# Patient Record
Sex: MALE | Race: WHITE | NOT HISPANIC OR LATINO | Employment: FULL TIME | URBAN - METROPOLITAN AREA
[De-identification: names, ages, dates, MRNs, and addresses within clinical notes are randomized per-mention and may not be internally consistent; named-entity substitution may affect disease eponyms.]

---

## 2017-01-06 ENCOUNTER — HISTORICAL (OUTPATIENT)
Dept: LAB | Facility: HOSPITAL | Age: 42
End: 2017-01-06

## 2017-09-13 LAB — RAPID GROUP A STREP (OHS): NEGATIVE

## 2018-04-27 ENCOUNTER — HISTORICAL (OUTPATIENT)
Dept: LAB | Facility: HOSPITAL | Age: 43
End: 2018-04-27

## 2018-04-27 LAB — PSA SERPL-MCNC: 0.59 NG/ML (ref 0–4)

## 2022-03-18 ENCOUNTER — HISTORICAL (OUTPATIENT)
Dept: ADMINISTRATIVE | Facility: HOSPITAL | Age: 47
End: 2022-03-18

## 2022-03-19 LAB — (HCYS)2 SERPL-MCNC: 10.58 (ref 5.08–15.39)

## 2022-04-11 ENCOUNTER — HISTORICAL (OUTPATIENT)
Dept: ADMINISTRATIVE | Facility: HOSPITAL | Age: 47
End: 2022-04-11

## 2022-04-28 VITALS
SYSTOLIC BLOOD PRESSURE: 150 MMHG | OXYGEN SATURATION: 99 % | DIASTOLIC BLOOD PRESSURE: 91 MMHG | BODY MASS INDEX: 32.61 KG/M2 | HEIGHT: 68 IN | WEIGHT: 215.19 LBS

## 2022-09-21 ENCOUNTER — HISTORICAL (OUTPATIENT)
Dept: ADMINISTRATIVE | Facility: HOSPITAL | Age: 47
End: 2022-09-21

## 2024-02-29 ENCOUNTER — HOSPITAL ENCOUNTER (EMERGENCY)
Facility: HOSPITAL | Age: 49
Discharge: HOME | End: 2024-02-29
Attending: FAMILY MEDICINE
Payer: COMMERCIAL

## 2024-02-29 VITALS
BODY MASS INDEX: 32.58 KG/M2 | OXYGEN SATURATION: 94 % | HEIGHT: 69 IN | SYSTOLIC BLOOD PRESSURE: 124 MMHG | TEMPERATURE: 98 F | WEIGHT: 220 LBS | HEART RATE: 95 BPM | DIASTOLIC BLOOD PRESSURE: 65 MMHG | RESPIRATION RATE: 18 BRPM

## 2024-02-29 DIAGNOSIS — N43.3 HYDROCELE, UNSPECIFIED HYDROCELE TYPE: Primary | ICD-10-CM

## 2024-02-29 LAB
APPEARANCE UR: CLEAR
BACTERIA #/AREA URNS AUTO: NORMAL /HPF
BILIRUB UR QL STRIP.AUTO: NEGATIVE
COLOR UR AUTO: YELLOW
GLUCOSE UR QL STRIP.AUTO: NEGATIVE
KETONES UR QL STRIP.AUTO: NEGATIVE
LEUKOCYTE ESTERASE UR QL STRIP.AUTO: NEGATIVE
NITRITE UR QL STRIP.AUTO: NEGATIVE
PH UR STRIP.AUTO: 6 [PH]
PROT UR QL STRIP.AUTO: NEGATIVE
RBC #/AREA URNS AUTO: NORMAL /HPF
RBC UR QL AUTO: NEGATIVE
SP GR UR STRIP.AUTO: 1.01 (ref 1–1.03)
SQUAMOUS #/AREA URNS AUTO: NORMAL /HPF
UROBILINOGEN UR STRIP-ACNC: 0.2
WBC #/AREA URNS AUTO: NORMAL /HPF

## 2024-02-29 PROCEDURE — 81003 URINALYSIS AUTO W/O SCOPE: CPT | Performed by: FAMILY MEDICINE

## 2024-02-29 PROCEDURE — 99284 EMERGENCY DEPT VISIT MOD MDM: CPT

## 2024-02-29 RX ORDER — CIPROFLOXACIN 500 MG/1
500 TABLET ORAL 2 TIMES DAILY
Qty: 20 TABLET | Refills: 0 | Status: SHIPPED | OUTPATIENT
Start: 2024-02-29 | End: 2024-03-10

## 2024-02-29 RX ORDER — DICLOFENAC SODIUM 50 MG/1
50 TABLET, DELAYED RELEASE ORAL 3 TIMES DAILY
Qty: 15 TABLET | Refills: 0 | Status: ON HOLD | OUTPATIENT
Start: 2024-02-29 | End: 2024-03-07

## 2024-03-01 NOTE — ED PROVIDER NOTES
Encounter Date: 2/29/2024       History     Chief Complaint   Patient presents with    Groin Swelling     Pt c/o scrotal swelling that started today. Denies pain, dysuria or discharge.     Groin swelling   48-year-old comes in with groin swelling to the right testicle patient states that he did fall has a very large bruise on his right inner thigh possibly related to this injury otherwise no chronic history patient is the history source        Review of patient's allergies indicates:  No Known Allergies  No past medical history on file.  No past surgical history on file.  No family history on file.     Review of Systems   Constitutional:  Negative for fever.   HENT:  Negative for sore throat.    Respiratory:  Negative for shortness of breath.    Cardiovascular:  Negative for chest pain.   Gastrointestinal:  Negative for nausea.   Genitourinary:  Positive for scrotal swelling and testicular pain. Negative for dysuria.   Musculoskeletal:  Negative for back pain.   Skin:  Negative for rash.   Neurological:  Negative for weakness.   Hematological:  Does not bruise/bleed easily.   All other systems reviewed and are negative.      Physical Exam     Initial Vitals [02/29/24 2010]   BP Pulse Resp Temp SpO2   124/65 95 18 98 °F (36.7 °C) (!) 94 %      MAP       --         Physical Exam    Nursing note and vitals reviewed.  Constitutional: He appears well-developed and well-nourished. He is not diaphoretic. No distress.   HENT:   Head: Normocephalic and atraumatic.   Right Ear: External ear normal.   Left Ear: External ear normal.   Nose: Nose normal.   Mouth/Throat: Oropharynx is clear and moist. No oropharyngeal exudate.   Eyes: Conjunctivae and EOM are normal. Pupils are equal, round, and reactive to light. Right eye exhibits no discharge. Left eye exhibits no discharge.   Neck: Neck supple. No thyromegaly present. No tracheal deviation present. No JVD present.   Normal range of motion.  Cardiovascular:  Normal rate,  regular rhythm, normal heart sounds and intact distal pulses.     Exam reveals no gallop and no friction rub.       No murmur heard.  Pulmonary/Chest: Breath sounds normal. No stridor. No respiratory distress. He has no wheezes. He has no rhonchi. He has no rales. He exhibits no tenderness.   Abdominal: Abdomen is soft. Bowel sounds are normal. He exhibits no distension. There is no abdominal tenderness. There is no rebound and no guarding.   Musculoskeletal:         General: No tenderness or edema. Normal range of motion.      Cervical back: Normal range of motion and neck supple.     Lymphadenopathy:     He has no cervical adenopathy.   Neurological: He is alert and oriented to person, place, and time. He has normal strength and normal reflexes. No cranial nerve deficit or sensory deficit. GCS score is 15. GCS eye subscore is 4. GCS verbal subscore is 5. GCS motor subscore is 6.   Skin: Skin is warm and dry. No rash and no abscess noted. No erythema.   Psychiatric: He has a normal mood and affect. His behavior is normal. Judgment and thought content normal.         ED Course   Procedures  Labs Reviewed   URINALYSIS, REFLEX TO URINE CULTURE - Normal   URINALYSIS, MICROSCOPIC          Imaging Results    None          Medications - No data to display  Medical Decision Making  Hydrocele varicocele testicular torsion injury                                      Clinical Impression:  Final diagnoses:  [N43.3] Hydrocele, unspecified hydrocele type (Primary)          ED Disposition Condition    Discharge Stable          ED Prescriptions       Medication Sig Dispense Start Date End Date Auth. Provider    ciprofloxacin HCl (CIPRO) 500 MG tablet Take 1 tablet (500 mg total) by mouth 2 (two) times daily. for 10 days 20 tablet 2/29/2024 3/10/2024 Maurice Ortiz MD    diclofenac (VOLTAREN) 50 MG EC tablet Take 1 tablet (50 mg total) by mouth 3 (three) times daily. for 5 days 15 tablet 2/29/2024 3/5/2024 Maurice Ortiz MD           Follow-up Information       Follow up With Specialties Details Why Contact Info    Jessica Loyd, NP Nurse Practitioner   [address] [place] [address]  [phone]               Maurice Ortiz MD  02/29/24 0227

## 2024-03-05 ENCOUNTER — HOSPITAL ENCOUNTER (INPATIENT)
Facility: HOSPITAL | Age: 49
LOS: 1 days | Discharge: HOME | DRG: 176 | End: 2024-03-07
Attending: EMERGENCY MEDICINE | Admitting: INTERNAL MEDICINE
Payer: COMMERCIAL

## 2024-03-05 DIAGNOSIS — I26.99 PULMONARY EMBOLI: ICD-10-CM

## 2024-03-05 DIAGNOSIS — T78.2XXA ANAPHYLAXIS, INITIAL ENCOUNTER: ICD-10-CM

## 2024-03-05 DIAGNOSIS — I26.99 PULMONARY EMBOLISM: Primary | ICD-10-CM

## 2024-03-05 DIAGNOSIS — J96.01 ACUTE RESPIRATORY FAILURE WITH HYPOXIA: ICD-10-CM

## 2024-03-05 DIAGNOSIS — R06.00 DYSPNEA: ICD-10-CM

## 2024-03-05 DIAGNOSIS — R09.02 HYPOXIA: ICD-10-CM

## 2024-03-05 LAB
ALBUMIN SERPL-MCNC: 3.6 G/DL (ref 3.5–5)
ALBUMIN/GLOB SERPL: 1 RATIO (ref 1.1–2)
ALP SERPL-CCNC: 79 UNIT/L (ref 40–150)
ALT SERPL-CCNC: 17 UNIT/L (ref 0–55)
AST SERPL-CCNC: 21 UNIT/L (ref 5–34)
BILIRUB SERPL-MCNC: 1.1 MG/DL
BUN SERPL-MCNC: 5.3 MG/DL (ref 8.9–20.6)
CALCIUM SERPL-MCNC: 9.2 MG/DL (ref 8.4–10.2)
CHLORIDE SERPL-SCNC: 104 MMOL/L (ref 98–107)
CO2 SERPL-SCNC: 25 MMOL/L (ref 22–29)
CREAT SERPL-MCNC: 0.82 MG/DL (ref 0.73–1.18)
ERYTHROCYTE [DISTWIDTH] IN BLOOD BY AUTOMATED COUNT: 17.6 % (ref 11.5–17)
GFR SERPLBLD CREATININE-BSD FMLA CKD-EPI: >60 MLS/MIN/1.73/M2
GLOBULIN SER-MCNC: 3.6 GM/DL (ref 2.4–3.5)
GLUCOSE SERPL-MCNC: 122 MG/DL (ref 74–100)
HCT VFR BLD AUTO: 50.8 % (ref 42–52)
HGB BLD-MCNC: 16 G/DL (ref 14–18)
MCH RBC QN AUTO: 26.8 PG (ref 27–31)
MCHC RBC AUTO-ENTMCNC: 31.5 G/DL (ref 33–36)
MCV RBC AUTO: 85.2 FL (ref 80–94)
NRBC BLD AUTO-RTO: 0 %
PLATELET # BLD AUTO: 189 X10(3)/MCL (ref 130–400)
PMV BLD AUTO: 10.2 FL (ref 7.4–10.4)
POTASSIUM SERPL-SCNC: 3.9 MMOL/L (ref 3.5–5.1)
PROT SERPL-MCNC: 7.2 GM/DL (ref 6.4–8.3)
RBC # BLD AUTO: 5.96 X10(6)/MCL (ref 4.7–6.1)
SODIUM SERPL-SCNC: 136 MMOL/L (ref 136–145)
WBC # SPEC AUTO: 12.03 X10(3)/MCL (ref 4.5–11.5)

## 2024-03-05 PROCEDURE — 99285 EMERGENCY DEPT VISIT HI MDM: CPT | Mod: 25

## 2024-03-05 PROCEDURE — 84484 ASSAY OF TROPONIN QUANT: CPT | Performed by: EMERGENCY MEDICINE

## 2024-03-05 PROCEDURE — 80053 COMPREHEN METABOLIC PANEL: CPT | Performed by: EMERGENCY MEDICINE

## 2024-03-05 PROCEDURE — 86850 RBC ANTIBODY SCREEN: CPT | Performed by: EMERGENCY MEDICINE

## 2024-03-05 PROCEDURE — 63600175 PHARM REV CODE 636 W HCPCS

## 2024-03-05 PROCEDURE — 96375 TX/PRO/DX INJ NEW DRUG ADDON: CPT

## 2024-03-05 PROCEDURE — 25000003 PHARM REV CODE 250: Performed by: EMERGENCY MEDICINE

## 2024-03-05 PROCEDURE — 85027 COMPLETE CBC AUTOMATED: CPT | Performed by: EMERGENCY MEDICINE

## 2024-03-05 RX ORDER — METHYLPREDNISOLONE SOD SUCC 125 MG
VIAL (EA) INJECTION
Status: COMPLETED
Start: 2024-03-05 | End: 2024-03-05

## 2024-03-05 RX ORDER — SULFAMETHOXAZOLE AND TRIMETHOPRIM 200; 40 MG/5ML; MG/5ML
8 SUSPENSION ORAL
Status: ON HOLD | COMMUNITY
End: 2024-03-06

## 2024-03-05 RX ORDER — METHYLPREDNISOLONE SOD SUCC 125 MG
125 VIAL (EA) INJECTION
Status: COMPLETED | OUTPATIENT
Start: 2024-03-05 | End: 2024-03-05

## 2024-03-05 RX ORDER — EPINEPHRINE 0.3 MG/.3ML
0.3 INJECTION SUBCUTANEOUS
Status: COMPLETED | OUTPATIENT
Start: 2024-03-06 | End: 2024-03-06

## 2024-03-05 RX ORDER — DIPHENHYDRAMINE HYDROCHLORIDE 50 MG/ML
INJECTION INTRAMUSCULAR; INTRAVENOUS
Status: COMPLETED
Start: 2024-03-05 | End: 2024-03-05

## 2024-03-05 RX ORDER — DIPHENHYDRAMINE HYDROCHLORIDE 50 MG/ML
50 INJECTION INTRAMUSCULAR; INTRAVENOUS
Status: ACTIVE | OUTPATIENT
Start: 2024-03-05 | End: 2024-03-06

## 2024-03-05 RX ORDER — FAMOTIDINE 10 MG/ML
40 INJECTION INTRAVENOUS
Status: COMPLETED | OUTPATIENT
Start: 2024-03-05 | End: 2024-03-05

## 2024-03-05 RX ADMIN — METHYLPREDNISOLONE SODIUM SUCCINATE 125 MG: 125 INJECTION, POWDER, FOR SOLUTION INTRAMUSCULAR; INTRAVENOUS at 10:03

## 2024-03-05 RX ADMIN — FAMOTIDINE 40 MG: 10 INJECTION, SOLUTION INTRAVENOUS at 10:03

## 2024-03-05 RX ADMIN — DIPHENHYDRAMINE HYDROCHLORIDE: 50 INJECTION INTRAMUSCULAR; INTRAVENOUS at 10:03

## 2024-03-05 RX ADMIN — Medication 125 MG: at 10:03

## 2024-03-05 NOTE — Clinical Note
Diagnosis: Pulmonary embolism [964442]  Future Attending Provider: FLORIAN PRESTON [[id number]]  Admit to which facility:: [facility] [[id number]]  Reason for IP Medical Treatment  (Clinical interventions that can only be accom plished in the IP setting? ) :: pulmonary embolism  I certify that Inpatient services for greater than or equal to 2 midnights are medically necessary:: Yes  Plans for Post-Acute care--if anticipated (pick the single best option):: B. Discharge home  with medical equipment

## 2024-03-06 LAB
ABORH RETYPE: NORMAL
ABS NEUT (OLG): 7.46 X10(3)/MCL (ref 2.1–9.2)
ALLENS TEST BLOOD GAS (OHS): YES
AORTIC ROOT ANNULUS: 3.7 CM
AORTIC VALVE CUSP SEPERATION: 2.2 CM
APTT PPP: 30.9 SECONDS (ref 23.2–33.7)
APTT PPP: 44 SECONDS (ref 23.2–33.7)
APTT PPP: 56.8 SECONDS (ref 23.2–33.7)
AV INDEX (PROSTH): 0.75
AV MEAN GRADIENT: 7 MMHG
AV PEAK GRADIENT: 13 MMHG
AV VALVE AREA BY VELOCITY RATIO: 2.12 CM²
AV VALVE AREA: 2.36 CM²
AV VELOCITY RATIO: 0.68
BASE EXCESS BLD CALC-SCNC: 2.3 MMOL/L (ref -2–2)
BASOPHILS # BLD AUTO: 0.03 X10(3)/MCL
BASOPHILS NFR BLD AUTO: 0.4 %
BASOPHILS NFR BLD MANUAL: 0.24 X10(3)/MCL (ref 0–0.2)
BASOPHILS NFR BLD MANUAL: 2 %
BLOOD GAS SAMPLE TYPE (OHS): ABNORMAL
BNP BLD-MCNC: <10 PG/ML
BSA FOR ECHO PROCEDURE: 2.2 M2
CA-I BLD-SCNC: 1.23 MMOL/L (ref 1.12–1.23)
CO2 BLDA-SCNC: 27.7 MMOL/L
COHGB MFR BLDA: 1.5 % (ref 0.5–1.5)
CV ECHO LV RWT: 0.43 CM
DOP CALC AO PEAK VEL: 1.79 M/S
DOP CALC AO VTI: 29.3 CM
DOP CALC LVOT AREA: 3.1 CM2
DOP CALC LVOT DIAMETER: 2 CM
DOP CALC LVOT PEAK VEL: 1.21 M/S
DOP CALC LVOT STROKE VOLUME: 69.08 CM3
DOP CALC MV VTI: 27.3 CM
DOP CALCLVOT PEAK VEL VTI: 22 CM
DRAWN BY BLOOD GAS (OHS): ABNORMAL
E WAVE DECELERATION TIME: 177 MSEC
E/A RATIO: 0.46
E/E' RATIO: 4.08 M/S
ECHO LV POSTERIOR WALL: 1.05 CM (ref 0.6–1.1)
EOSINOPHIL # BLD AUTO: 0.03 X10(3)/MCL (ref 0–0.9)
EOSINOPHIL NFR BLD AUTO: 0.4 %
EOSINOPHIL NFR BLD MANUAL: 0.48 X10(3)/MCL (ref 0–0.9)
EOSINOPHIL NFR BLD MANUAL: 4 %
ERYTHROCYTE [DISTWIDTH] IN BLOOD BY AUTOMATED COUNT: 16.6 % (ref 11.5–17)
FLUAV AG UPPER RESP QL IA.RAPID: NOT DETECTED
FLUBV AG UPPER RESP QL IA.RAPID: NOT DETECTED
FRACTIONAL SHORTENING: 27 % (ref 28–44)
GROUP & RH: NORMAL
HCO3 BLDA-SCNC: 26.5 MMOL/L (ref 22–26)
HCT VFR BLD AUTO: 48 % (ref 42–52)
HGB BLD-MCNC: 15 G/DL (ref 14–18)
IMM GRANULOCYTES # BLD AUTO: 0.02 X10(3)/MCL (ref 0–0.04)
IMM GRANULOCYTES NFR BLD AUTO: 0.2 %
INDIRECT COOMBS: NORMAL
INR PPP: 1.2
INSTRUMENT WBC (OLG): 12.03 X10(3)/MCL
INTERVENTRICULAR SEPTUM: 1.01 CM (ref 0.6–1.1)
LACTATE SERPL-SCNC: 1 MMOL/L (ref 0.5–2.2)
LEFT INTERNAL DIMENSION IN SYSTOLE: 3.55 CM (ref 2.1–4)
LEFT VENTRICLE DIASTOLIC VOLUME INDEX: 52.09 ML/M2
LEFT VENTRICLE DIASTOLIC VOLUME: 112 ML
LEFT VENTRICLE MASS INDEX: 85 G/M2
LEFT VENTRICLE SYSTOLIC VOLUME INDEX: 24.5 ML/M2
LEFT VENTRICLE SYSTOLIC VOLUME: 52.6 ML
LEFT VENTRICULAR INTERNAL DIMENSION IN DIASTOLE: 4.88 CM (ref 3.5–6)
LEFT VENTRICULAR MASS: 182.01 G
LPM (OHS): 3
LV LATERAL E/E' RATIO: 3.06 M/S
LV SEPTAL E/E' RATIO: 6.13 M/S
LVOT MG: 3 MMHG
LVOT MV: 0.83 CM/S
LYMPHOCYTES # BLD AUTO: 0.5 X10(3)/MCL (ref 0.6–4.6)
LYMPHOCYTES NFR BLD AUTO: 5.9 %
LYMPHOCYTES NFR BLD MANUAL: 25 %
LYMPHOCYTES NFR BLD MANUAL: 3.01 X10(3)/MCL
MACROCYTES BLD QL SMEAR: ABNORMAL
MCH RBC QN AUTO: 27.3 PG (ref 27–31)
MCHC RBC AUTO-ENTMCNC: 31.3 G/DL (ref 33–36)
MCV RBC AUTO: 87.4 FL (ref 80–94)
METHGB MFR BLDA: 0.5 % (ref 0.4–1.5)
MONOCYTES # BLD AUTO: 0.1 X10(3)/MCL (ref 0.1–1.3)
MONOCYTES NFR BLD AUTO: 1.2 %
MONOCYTES NFR BLD MANUAL: 0.84 X10(3)/MCL (ref 0.1–1.3)
MONOCYTES NFR BLD MANUAL: 7 %
MV MEAN GRADIENT: 2 MMHG
MV PEAK A VEL: 1.07 M/S
MV PEAK E VEL: 0.49 M/S
MV PEAK GRADIENT: 7 MMHG
MV STENOSIS PRESSURE HALF TIME: 67 MS
MV VALVE AREA BY CONTINUITY EQUATION: 2.53 CM2
MV VALVE AREA P 1/2 METHOD: 3.28 CM2
NEUTROPHILS # BLD AUTO: 7.8 X10(3)/MCL (ref 2.1–9.2)
NEUTROPHILS NFR BLD AUTO: 91.9 %
NEUTROPHILS NFR BLD MANUAL: 62 %
NRBC BLD AUTO-RTO: 0 %
NRBC BLD MANUAL-RTO: 1 %
O2 HB BLOOD GAS (OHS): 93.3 % (ref 94–97)
OXYGEN DEVICE BLOOD GAS (OHS): ABNORMAL
OXYHGB MFR BLDA: 15.3 G/DL (ref 12–16)
PCO2 BLDA: 39 MMHG (ref 35–45)
PH BLDA: 7.44 [PH] (ref 7.35–7.45)
PISA MRMAX VEL: 1.66 M/S
PISA TR MAX VEL: 1.84 M/S
PLATELET # BLD AUTO: 184 X10(3)/MCL (ref 130–400)
PLATELET # BLD EST: NORMAL 10*3/UL
PMV BLD AUTO: 10 FL (ref 7.4–10.4)
PO2 BLDA: 66 MMHG (ref 80–100)
POLYCHROMASIA BLD QL SMEAR: ABNORMAL
POTASSIUM BLOOD GAS (OHS): 4.1 MMOL/L (ref 3.5–5)
PROTHROMBIN TIME: 14.7 SECONDS (ref 12.5–14.5)
PV PEAK GRADIENT: 9 MMHG
PV PEAK VELOCITY: 1.51 M/S
RA PRESSURE ESTIMATED: 8 MMHG
RBC # BLD AUTO: 5.49 X10(6)/MCL (ref 4.7–6.1)
RBC MORPH BLD: ABNORMAL
RV MID DIAMA: 2.86 CM
RV TB RVSP: 10 MMHG
RV TISSUE DOPPLER FREE WALL SYSTOLIC VELOCITY 1 (APICAL 4 CHAMBER VIEW): 16.1 CM/S
SAMPLE SITE BLOOD GAS (OHS): ABNORMAL
SAO2 % BLDA: 93.5 %
SARS-COV-2 RNA RESP QL NAA+PROBE: NOT DETECTED
SODIUM BLOOD GAS (OHS): 131 MMOL/L (ref 137–145)
SPECIMEN OUTDATE: NORMAL
TDI LATERAL: 0.16 M/S
TDI SEPTAL: 0.08 M/S
TDI: 0.12 M/S
TR MAX PG: 14 MMHG
TRICUSPID ANNULAR PLANE SYSTOLIC EXCURSION: 2.68 CM
TROPONIN I SERPL-MCNC: 0.23 NG/ML (ref 0–0.04)
TROPONIN I SERPL-MCNC: <0.01 NG/ML (ref 0–0.04)
TV REST PULMONARY ARTERY PRESSURE: 22 MMHG
WBC # SPEC AUTO: 8.48 X10(3)/MCL (ref 4.5–11.5)
Z-SCORE OF LEFT VENTRICULAR DIMENSION IN END DIASTOLE: -3.58
Z-SCORE OF LEFT VENTRICULAR DIMENSION IN END SYSTOLE: -1.43

## 2024-03-06 PROCEDURE — 82803 BLOOD GASES ANY COMBINATION: CPT

## 2024-03-06 PROCEDURE — 87040 BLOOD CULTURE FOR BACTERIA: CPT | Performed by: EMERGENCY MEDICINE

## 2024-03-06 PROCEDURE — 25000003 PHARM REV CODE 250: Performed by: EMERGENCY MEDICINE

## 2024-03-06 PROCEDURE — 25500020 PHARM REV CODE 255: Performed by: EMERGENCY MEDICINE

## 2024-03-06 PROCEDURE — 84484 ASSAY OF TROPONIN QUANT: CPT | Performed by: EMERGENCY MEDICINE

## 2024-03-06 PROCEDURE — 36600 WITHDRAWAL OF ARTERIAL BLOOD: CPT

## 2024-03-06 PROCEDURE — 85610 PROTHROMBIN TIME: CPT | Performed by: EMERGENCY MEDICINE

## 2024-03-06 PROCEDURE — 63600175 PHARM REV CODE 636 W HCPCS: Performed by: EMERGENCY MEDICINE

## 2024-03-06 PROCEDURE — 99900035 HC TECH TIME PER 15 MIN (STAT)

## 2024-03-06 PROCEDURE — 83605 ASSAY OF LACTIC ACID: CPT | Performed by: EMERGENCY MEDICINE

## 2024-03-06 PROCEDURE — 85730 THROMBOPLASTIN TIME PARTIAL: CPT | Performed by: INTERNAL MEDICINE

## 2024-03-06 PROCEDURE — 96372 THER/PROPH/DIAG INJ SC/IM: CPT | Mod: 59 | Performed by: EMERGENCY MEDICINE

## 2024-03-06 PROCEDURE — 27000221 HC OXYGEN, UP TO 24 HOURS

## 2024-03-06 PROCEDURE — 11000001 HC ACUTE MED/SURG PRIVATE ROOM

## 2024-03-06 PROCEDURE — 85025 COMPLETE CBC W/AUTO DIFF WBC: CPT | Performed by: EMERGENCY MEDICINE

## 2024-03-06 PROCEDURE — 85730 THROMBOPLASTIN TIME PARTIAL: CPT | Performed by: EMERGENCY MEDICINE

## 2024-03-06 PROCEDURE — 96375 TX/PRO/DX INJ NEW DRUG ADDON: CPT

## 2024-03-06 PROCEDURE — 93005 ELECTROCARDIOGRAM TRACING: CPT

## 2024-03-06 PROCEDURE — 0240U COVID/FLU A&B PCR: CPT | Performed by: EMERGENCY MEDICINE

## 2024-03-06 PROCEDURE — 96365 THER/PROPH/DIAG IV INF INIT: CPT

## 2024-03-06 PROCEDURE — 83880 ASSAY OF NATRIURETIC PEPTIDE: CPT | Performed by: EMERGENCY MEDICINE

## 2024-03-06 RX ORDER — PANTOPRAZOLE SODIUM 40 MG/1
40 TABLET, DELAYED RELEASE ORAL DAILY
COMMUNITY

## 2024-03-06 RX ORDER — AMLODIPINE AND BENAZEPRIL HYDROCHLORIDE 10; 40 MG/1; MG/1
1 CAPSULE ORAL DAILY
Status: ON HOLD | COMMUNITY
End: 2024-03-13

## 2024-03-06 RX ORDER — TESTOSTERONE CYPIONATE 200 MG/ML
100 INJECTION, SOLUTION INTRAMUSCULAR
Status: ON HOLD | COMMUNITY
End: 2024-03-06

## 2024-03-06 RX ORDER — PANTOPRAZOLE SODIUM 40 MG/1
40 TABLET, DELAYED RELEASE ORAL DAILY
Status: DISCONTINUED | OUTPATIENT
Start: 2024-03-07 | End: 2024-03-07 | Stop reason: HOSPADM

## 2024-03-06 RX ORDER — AMLODIPINE BESYLATE 5 MG/1
10 TABLET ORAL DAILY
Status: DISCONTINUED | OUTPATIENT
Start: 2024-03-07 | End: 2024-03-07 | Stop reason: HOSPADM

## 2024-03-06 RX ORDER — CITALOPRAM 10 MG/1
10 TABLET ORAL DAILY
COMMUNITY

## 2024-03-06 RX ORDER — HEPARIN SODIUM,PORCINE/D5W 25000/250
0-40 INTRAVENOUS SOLUTION INTRAVENOUS CONTINUOUS
Status: DISCONTINUED | OUTPATIENT
Start: 2024-03-06 | End: 2024-03-07

## 2024-03-06 RX ORDER — CITALOPRAM 10 MG/1
10 TABLET ORAL DAILY
Status: DISCONTINUED | OUTPATIENT
Start: 2024-03-07 | End: 2024-03-07 | Stop reason: HOSPADM

## 2024-03-06 RX ORDER — LISINOPRIL 10 MG/1
40 TABLET ORAL DAILY
Status: DISCONTINUED | OUTPATIENT
Start: 2024-03-07 | End: 2024-03-07 | Stop reason: HOSPADM

## 2024-03-06 RX ADMIN — EPINEPHRINE 0.3 MG: 0.3 INJECTION INTRAMUSCULAR at 12:03

## 2024-03-06 RX ADMIN — IOHEXOL 100 ML: 350 INJECTION, SOLUTION INTRAVENOUS at 12:03

## 2024-03-06 RX ADMIN — HEPARIN SODIUM 23 UNITS/KG/HR: 10000 INJECTION, SOLUTION INTRAVENOUS at 07:03

## 2024-03-06 RX ADMIN — HEPARIN SODIUM 27.95 UNITS/KG/HR: 10000 INJECTION, SOLUTION INTRAVENOUS at 10:03

## 2024-03-06 RX ADMIN — HEPARIN SODIUM 18 UNITS/KG/HR: 10000 INJECTION, SOLUTION INTRAVENOUS at 02:03

## 2024-03-06 RX ADMIN — CEFTRIAXONE SODIUM 1 G: 1 INJECTION, POWDER, FOR SOLUTION INTRAMUSCULAR; INTRAVENOUS at 03:03

## 2024-03-06 RX ADMIN — HEPARIN SODIUM 21 UNITS/KG/HR: 10000 INJECTION, SOLUTION INTRAVENOUS at 12:03

## 2024-03-06 NOTE — PLAN OF CARE
03/06/24 1338   Discharge Assessment   Assessment Type Discharge Planning Assessment   Confirmed/corrected address, phone number and insurance Yes   Confirmed Demographics Correct on Facesheet   Source of Information patient   Communicated LAYLA with patient/caregiver Date not available/Unable to determine   Reason For Admission PE   People in Home spouse   Do you expect to return to your current living situation? Yes   Do you have help at home or someone to help you manage your care at home? Yes   Who are your caregiver(s) and their phone number(s)? Maria Luisa Gold [phone]   Prior to hospitilization cognitive status: Unable to Assess   Current cognitive status: Alert/Oriented   Walking or Climbing Stairs Difficulty no   Dressing/Bathing Difficulty no   Home Accessibility wheelchair accessible   Home Layout Able to live on 1st floor   Equipment Currently Used at Home none   Patient currently being followed by outpatient case management? No   Do you currently have service(s) that help you manage your care at home? No   Do you take prescription medications? Yes   Do you have prescription coverage? Yes   Coverage BCBS   Who is going to help you get home at discharge? Family   How do you get to doctors appointments? car, drives self   Are you on dialysis? No   Do you take coumadin? No   Discharge Plan A Home with family   Discharge Plan B Home with family   DME Needed Upon Discharge  none   Discharge Plan discussed with: Patient   Transition of Care Barriers None   OTHER   Name(s) of People in Home Maria Luisa Asif

## 2024-03-06 NOTE — CONSULTS
Inpatient consult to Cardiology  Consult performed by: Kristel Mcgee FNP  Consult ordered by: Cody Moncada MD  Reason for consult: PE        [facility] - 6th Floor Medical Telemetry    Cardiology  Consult Note    Patient Name: Oracio Gold  MRN: [id number]  Admission Date: 3/5/2024  Hospital Length of Stay: 0 days  Code Status: No Order   Attending Provider: Cody Moncada*   Consulting Provider: TOMMIE Lawson  Primary Care Physician: Jessica Loyd NP  Principal Problem:<principal problem not specified>    Patient information was obtained from patient and ER records.     Subjective:     Reason for Consult: PE    HPI: Mr. Gold is a 48 year old male who is previously known to [facility], Dr. Kasper (2021). He presents to the ER with complaints of upper lip swelling. He reports that he began to have a skin rash, welts, & lip swelling that began last night after taking 3 doses of bactrim over the last few days. He denies CP, SOB, palpitations, nausea, or vomiting. Significant labs include WBC 12.03, Trop 0.234 -> <0.010. A CTA chest was obtained and demonstrated a large defect in the right main pulmonary artery, which extends into the right lower lobe, lobar and proximal segmental divisions without evidence of RV strain and possible PNA. He denies any provoking  factors, such as recent travel, recent surgery, history of CA, or family hx. Of note, the patient was diagnosed with a PE in 2021 and was on oral anticoagulation x 6 weeks. [facility] has been consulted to further evaluate the patient's PE.     PMH: PE, HTN, anxiety   PSH: excision of soft tissue  Family History: Father - CABG x 4, irregular heart rate  Social History: Every day tobacco use. Denies alcohol or illicit drug use.     Previous Cardiac Diagnostics:   CTA Chest 3.6.24:  There is a large defect in the right main pulmonary artery, which extends into the right lower lobe, lobar and proximal  segmental divisions. This is consistent with pulmonary thromboembolism. No right ventricular strain is seen.  The lungs are slightly heterogeneous, which may reflect small airways disease versus mosaic attenuation. Ill-defined ground-glass opacities are seen in the posterior aspect of the right upper lobe, which may reflect pneumonia. Correlate with clinical and laboratory findings.  Trace right pleural effusion is seen.    BLE NIVA (9.16.21):  Positive acute deep vein thrombosis was identified in the left common femoral, sapheno-femoral junction, superficial  femoral and popliteal veins.  Superficial thrombosis was identified in the left above and below knee greater saphenous vein.     Review of patient's allergies indicates:   Allergen Reactions    Bactrim [sulfamethoxazole-trimethoprim] Anaphylaxis     ANGIOEDEMA     No current facility-administered medications on file prior to encounter.     Current Outpatient Medications on File Prior to Encounter   Medication Sig    ciprofloxacin HCl (CIPRO) 500 MG tablet Take 1 tablet (500 mg total) by mouth 2 (two) times daily. for 10 days    [] diclofenac (VOLTAREN) 50 MG EC tablet Take 1 tablet (50 mg total) by mouth 3 (three) times daily. for 5 days    sulfamethoxazole-trimethoprim 200-40 mg/5 ml (BACTRIM,SEPTRA) 200-40 mg/5 mL Susp Take 8 mg/kg/day by mouth every 12 (twelve) hours.     Review of Systems   HENT:          Lip swelling   Respiratory:  Negative for shortness of breath.    Cardiovascular:  Negative for chest pain and palpitations.       Objective:     Vital Signs (Most Recent):  Temp: 98 °F (36.7 °C) (24 0724)  Pulse: 69 (24 0741)  Resp: 19 (24 0741)  BP: (!) 117/58 (24 0651)  SpO2: 96 % (24 0947) Vital Signs (24h Range):  Temp:  [98 °F (36.7 °C)-98.5 °F (36.9 °C)] 98 °F (36.7 °C)  Pulse:  [69-92] 69  Resp:  [17-26] 19  SpO2:  [89 %-99 %] 96 %  BP: (109-148)/() 117/58   Weight: 99.8 kg (220 lb)  Body mass index is  32.49 kg/m².  SpO2: 96 %       Intake/Output Summary (Last 24 hours) at 3/6/2024 1030  Last data filed at 3/6/2024 0735  Gross per 24 hour   Intake --   Output 800 ml   Net -800 ml     Lines/Drains/Airways       Peripheral Intravenous Line  Duration                  Peripheral IV - Single Lumen 03/05/24 2227 20 G Distal;Posterior;Right Forearm <1 day         Peripheral IV - Single Lumen 03/05/24 2230 20 G Posterior;Right Hand <1 day                  Significant Labs:  Recent Results (from the past 72 hour(s))   Comprehensive metabolic panel    Collection Time: 03/05/24 10:28 PM   Result Value Ref Range    Sodium Level 136 136 - 145 mmol/L    Potassium Level 3.9 3.5 - 5.1 mmol/L    Chloride 104 98 - 107 mmol/L    Carbon Dioxide 25 22 - 29 mmol/L    Glucose Level 122 (H) 74 - 100 mg/dL    Blood Urea Nitrogen 5.3 (L) 8.9 - 20.6 mg/dL    Creatinine 0.82 0.73 - 1.18 mg/dL    Calcium Level Total 9.2 8.4 - 10.2 mg/dL    Protein Total 7.2 6.4 - 8.3 gm/dL    Albumin Level 3.6 3.5 - 5.0 g/dL    Globulin 3.6 (H) 2.4 - 3.5 gm/dL    Albumin/Globulin Ratio 1.0 (L) 1.1 - 2.0 ratio    Bilirubin Total 1.1 <=1.5 mg/dL    Alkaline Phosphatase 79 40 - 150 unit/L    Alanine Aminotransferase 17 0 - 55 unit/L    Aspartate Aminotransferase 21 5 - 34 unit/L    eGFR >60 mls/min/1.73/m2   Type & Screen    Collection Time: 03/05/24 10:28 PM   Result Value Ref Range    Group & Rh A POS     Indirect Rylee GEL NEG     Specimen Outdate 03/08/2024 23:59    CBC with Differential    Collection Time: 03/05/24 10:28 PM   Result Value Ref Range    WBC 12.03 (H) 4.50 - 11.50 x10(3)/mcL    RBC 5.96 4.70 - 6.10 x10(6)/mcL    Hgb 16.0 14.0 - 18.0 g/dL    Hct 50.8 42.0 - 52.0 %    MCV 85.2 80.0 - 94.0 fL    MCH 26.8 (L) 27.0 - 31.0 pg    MCHC 31.5 (L) 33.0 - 36.0 g/dL    RDW 17.6 (H) 11.5 - 17.0 %    Platelet 189 130 - 400 x10(3)/mcL    MPV 10.2 7.4 - 10.4 fL    NRBC% 0.0 %   Manual Differential    Collection Time: 03/05/24 10:28 PM   Result Value Ref Range     WBC 12.03 x10(3)/mcL    Neutrophils % 62 %    Lymphs % 25 %    Monocytes % 7 %    Eosinophils % 4 %    Basophils % 2 %    nRBC % 1 %    Neutrophils Abs 7.4586 2.1 - 9.2 x10(3)/mcL    Lymphs Abs 3.0075 0.6 - 4.6 x10(3)/mcL    Monocytes Abs 0.8421 0.1 - 1.3 x10(3)/mcL    Eosinophils Abs 0.4812 0 - 0.9 x10(3)/mcL    Basophils Abs 0.2406 (H) 0 - 0.2 x10(3)/mcL    Platelets Normal Normal, Adequate    RBC Morph Abnormal (A) Normal    Polychromasia 1+ (A) (none)    Macrocytosis 1+ (A) (none)   Troponin I    Collection Time: 03/05/24 10:28 PM   Result Value Ref Range    Troponin-I 0.234 (H) 0.000 - 0.045 ng/mL   Brain natriuretic peptide    Collection Time: 03/06/24 12:51 AM   Result Value Ref Range    Natriuretic Peptide <10.0 <=100.0 pg/mL   ABORH RETYPE    Collection Time: 03/06/24 12:51 AM   Result Value Ref Range    ABORH Retype A POS    Echo    Collection Time: 03/06/24  1:44 AM   Result Value Ref Range    BSA 2.2 m2    LVOT stroke volume 69.08 cm3    LVIDd 4.88 3.5 - 6.0 cm    LV Systolic Volume 52.60 mL    LV Systolic Volume Index 24.5 mL/m2    LVIDs 3.55 2.1 - 4.0 cm    LV Diastolic Volume 112.00 mL    LV Diastolic Volume Index 52.09 mL/m2    IVS 1.01 0.6 - 1.1 cm    LVOT diameter 2.00 cm    LVOT area 3.1 cm2    FS 27 (A) 28 - 44 %    Left Ventricle Relative Wall Thickness 0.43 cm    Posterior Wall 1.05 0.6 - 1.1 cm    LV mass 182.01 g    LV Mass Index 85 g/m2    MV Peak E Kanu 0.49 m/s    TDI LATERAL 0.16 m/s    TDI SEPTAL 0.08 m/s    E/E' ratio 4.08 m/s    MV Peak A Kanu 1.07 m/s    TR Max Kanu 1.84 m/s    E/A ratio 0.46     E wave deceleration time 177.00 msec    LV SEPTAL E/E' RATIO 6.13 m/s    LV LATERAL E/E' RATIO 3.06 m/s    LVOT peak kanu 1.21 m/s    Left Ventricular Outflow Tract Mean Velocity 0.83 cm/s    Left Ventricular Outflow Tract Mean Gradient 3.00 mmHg    RV mid diameter 2.86 cm    RV S' 16.10 cm/s    TAPSE 2.68 cm    AV mean gradient 7 mmHg    AV peak gradient 13 mmHg    Ao peak kanu 1.79 m/s    Ao  VTI 29.30 cm    LVOT peak VTI 22.00 cm    AV valve area 2.36 cm²    AV Velocity Ratio 0.68     AV index (prosthetic) 0.75     OSCAR by Velocity Ratio 2.12 cm²    Mr max tala 1.66 m/s    MV mean gradient 2 mmHg    MV peak gradient 7 mmHg    MV stenosis pressure 1/2 time 67.00 ms    MV valve area p 1/2 method 3.28 cm2    MV valve area by continuity eq 2.53 cm2    MV VTI 27.3 cm    Triscuspid Valve Regurgitation Peak Gradient 14 mmHg    PV PEAK VELOCITY 1.51 m/s    PV peak gradient 9 mmHg    Ao root annulus 3.70 cm    Mean e' 0.12 m/s    ZLVIDS -1.43     ZLVIDD -3.58     AORTIC VALVE CUSP SEPERATION 2.20 cm    TV resting pulmonary artery pressure 22 mmHg    RV TB RVSP 10 mmHg    Est. RA pres 8 mmHg   COVID/FLU A&B PCR    Collection Time: 03/06/24  2:37 AM   Result Value Ref Range    Influenza A PCR Not Detected Not Detected    Influenza B PCR Not Detected Not Detected    SARS-CoV-2 PCR Not Detected Not Detected, Negative   APTT    Collection Time: 03/06/24  2:38 AM   Result Value Ref Range    PTT 30.9 23.2 - 33.7 seconds   Protime-INR    Collection Time: 03/06/24  2:38 AM   Result Value Ref Range    PT 14.7 (H) 12.5 - 14.5 seconds    INR 1.2 <=1.3   CBC with Differential    Collection Time: 03/06/24  3:20 AM   Result Value Ref Range    WBC 8.48 4.50 - 11.50 x10(3)/mcL    RBC 5.49 4.70 - 6.10 x10(6)/mcL    Hgb 15.0 14.0 - 18.0 g/dL    Hct 48.0 42.0 - 52.0 %    MCV 87.4 80.0 - 94.0 fL    MCH 27.3 27.0 - 31.0 pg    MCHC 31.3 (L) 33.0 - 36.0 g/dL    RDW 16.6 11.5 - 17.0 %    Platelet 184 130 - 400 x10(3)/mcL    MPV 10.0 7.4 - 10.4 fL    Neut % 91.9 %    Lymph % 5.9 %    Mono % 1.2 %    Eos % 0.4 %    Basophil % 0.4 %    Lymph # 0.50 (L) 0.6 - 4.6 x10(3)/mcL    Neut # 7.80 2.1 - 9.2 x10(3)/mcL    Mono # 0.10 0.1 - 1.3 x10(3)/mcL    Eos # 0.03 0 - 0.9 x10(3)/mcL    Baso # 0.03 <=0.2 x10(3)/mcL    IG# 0.02 0 - 0.04 x10(3)/mcL    IG% 0.2 %    NRBC% 0.0 %   Lactic acid, plasma    Collection Time: 03/06/24  3:20 AM   Result Value  Ref Range    Lactic Acid Level 1.0 0.5 - 2.2 mmol/L   Troponin I    Collection Time: 03/06/24  4:56 AM   Result Value Ref Range    Troponin-I <0.010 0.000 - 0.045 ng/mL   RT Blood Gas    Collection Time: 03/06/24  9:40 AM   Result Value Ref Range    Sample Type Arterial Blood     Sample site Right Radial Artery     Drawn by sd rrt     pH, Blood gas 7.440 7.350 - 7.450    pCO2, Blood gas 39.0 35.0 - 45.0 mmHg    pO2, Blood gas 66.0 (L) 80.0 - 100.0 mmHg    Sodium, Blood Gas 131 (L) 137 - 145 mmol/L    Potassium, Blood Gas 4.1 3.5 - 5.0 mmol/L    Calcium Level Ionized 1.23 1.12 - 1.23 mmol/L    TOC2, Blood gas 27.7 mmol/L    Base Excess, Blood gas 2.30 (H) -2.00 - 2.00 mmol/L    sO2, Blood gas 93.5 %    HCO3, Blood gas 26.5 (H) 22.0 - 26.0 mmol/L    THb, Blood gas 15.3 12 - 16 g/dL    O2 Hb, Blood Gas 93.3 (L) 94.0 - 97.0 %    CO Hgb 1.5 0.5 - 1.5 %    Met Hgb 0.5 0.4 - 1.5 %    Allens Test Yes     Oxygen Device, Blood gas Cannula     LPM 3      Significant Imaging:  Imaging Results              CTA Chest Non-Coronary (PE Studies) (Preliminary result)  Result time 03/06/24 00:49:02      Preliminary result by Emeterio Lopez Jr., MD (03/06/24 00:49:02)                   Narrative:    START OF REPORT:  Technique: CT Scan of the chest was performed with intravenous contrast with direct axial images as well as sagittal and coronal reconstruction images pulmonary embolus protocol.    Dosage Information: Automated Exposure Control was utilized.    Comparison: None.    Clinical History: Sob, r/o pe.    Findings:  Soft Tissues: Unremarkable.  Neck: The thyroid gland appear unremarkable.  Mediastinum: The mediastinal structures are within normal limits.  Heart: The heart size is within normal limits.  Aorta: Unremarkable appearing aorta. No aortic dissection or aneurysm is seen.  Pulmonary Arteries: There is a large defect in the right main pulmonary artery, which extends into the right lower lobe, lobar and proximal  segmental divisions. This is consistent with pulmonary thromboembolism. No right ventricular strain is seen. The remainder of the pulmonary arteries are patent.  Lungs: The lungs are slightly heterogeneous, which may reflect small airways disease versus mosaic attenuation. Ill-defined ground-glass opacities are seen in the posterior aspect of the right upper lobe, which may reflect pneumonia. Streaky densities in the lingula of left upper lobe look like scar.  Pleura: Trace right pleural effusion is seen. No pneumothorax is seen.  Bony Structures:  Spine: Mild spondylolytic changes are seen in the thoracic spine.  Abdomen: The visualized upper abdominal organs appear unremarkable.    Notifications: The results were discussed with the emergency room physician Dr. Donaldson prior to dictation at 2024-03-06 00:47:51 CST.      Impression:  1. There is a large defect in the right main pulmonary artery, which extends into the right lower lobe, lobar and proximal segmental divisions. This is consistent with pulmonary thromboembolism. No right ventricular strain is seen.  2. The lungs are slightly heterogeneous, which may reflect small airways disease versus mosaic attenuation. Ill-defined ground-glass opacities are seen in the posterior aspect of the right upper lobe, which may reflect pneumonia. Correlate with clinical and laboratory findings.  3. Trace right pleural effusion is seen.  4. Details and other findings as discussed above.                                      EKG:     Telemetry:  SR    Physical Exam  HENT:      Head: Normocephalic.      Nose: Nose normal.      Mouth/Throat:      Mouth: Mucous membranes are moist.   Eyes:      Extraocular Movements: Extraocular movements intact.   Cardiovascular:      Rate and Rhythm: Normal rate and regular rhythm.      Pulses: Normal pulses.      Heart sounds: Normal heart sounds.   Pulmonary:      Effort: Pulmonary effort is normal.      Breath sounds: Normal breath sounds.       Comments: 4 L NC  Abdominal:      Palpations: Abdomen is soft.   Skin:     General: Skin is warm and dry.      Findings: Rash present.   Neurological:      Mental Status: He is alert and oriented to person, place, and time.   Psychiatric:         Behavior: Behavior normal.         Home Medications:   No current facility-administered medications on file prior to encounter.     Current Outpatient Medications on File Prior to Encounter   Medication Sig Dispense Refill    ciprofloxacin HCl (CIPRO) 500 MG tablet Take 1 tablet (500 mg total) by mouth 2 (two) times daily. for 10 days 20 tablet 0    [] diclofenac (VOLTAREN) 50 MG EC tablet Take 1 tablet (50 mg total) by mouth 3 (three) times daily. for 5 days 15 tablet 0    sulfamethoxazole-trimethoprim 200-40 mg/5 ml (BACTRIM,SEPTRA) 200-40 mg/5 mL Susp Take 8 mg/kg/day by mouth every 12 (twelve) hours.       Current Inpatient Medications:    Current Facility-Administered Medications:     heparin 25,000 units in dextrose 5% (100 units/ml) IV bolus from bag HIGH INTENSITY nomogram - [facility], 60 Units/kg (Adjusted), Intravenous, PRN, Jessica Moody MD    heparin 25,000 units in dextrose 5% (100 units/ml) IV bolus from bag HIGH INTENSITY nomogram - [facility], 30 Units/kg (Adjusted), Intravenous, PRNFransisco Rachel A, MD    heparin 25,000 units in dextrose 5% 250 mL (100 units/mL) infusion HIGH INTENSITY nomogram - [facility], 0-40 Units/kg/hr (Adjusted), Intravenous, Continuous, Jessica Moody MD, Last Rate: 14.8 mL/hr at 24, 18 Units/kg/hr at 24  VTE Risk Mitigation (From admission, onward)           Ordered     heparin 25,000 units in dextrose 5% (100 units/ml) IV bolus from bag HIGH INTENSITY nomogram - [facility]  As needed (PRN)        Question:  Heparin Infusion Adjustment (DO NOT MODIFY ANSWER)  Answer:  [email or web address]\Images\Pharmacy\HeparinInfusions\heparin HIGH INTENSITY nomogram for [facility] [id number].[email or web address]    24     heparin 25,000 units in  dextrose 5% (100 units/ml) IV bolus from bag HIGH INTENSITY nomogram - [facility]  As needed (PRN)        Question:  Heparin Infusion Adjustment (DO NOT MODIFY ANSWER)  Answer:  \\[email or web address]\epic\Images\Pharmacy\HeparinInfusions\heparin HIGH INTENSITY nomogram for [facility] [id number].pdf    03/06/24 0215     heparin 25,000 units in dextrose 5% 250 mL (100 units/mL) infusion HIGH INTENSITY nomogram - [facility]  Continuous        Question:  Begin at (units/kg/hr)  Answer:  18 03/06/24 0215                  Assessment:   Acute PE    - CTA Chest (3.6.24): large defect in the right main pulmonary artery, which extends into the right lower lobe, lobar and proximal segmental divisions.  Hx of PE (2021)    - Not on OAC  Angioedema/Allergic Reaction - Improving     - s/t Bactrim   HTN  Anxiety     Plan:   Continue heparin gtt per protocol.   No plans for invasive procedure at this time.  Start Eliquis PE dosage tomorrow -> Eliquis 10 mg BID x 7 days, then Eliquis 5 mg BID. Will need lifelong treatment.     Thank you for your consult.     Kristel Mcgee, TOMMIE  Cardiology  [facility] - 6th Floor Medical Telemetry  03/06/2024

## 2024-03-06 NOTE — CONSULTS
Pulmonary  Medicine Consult Note    Reason for Consultation:  Pulmonary Embolism    HPI:  Patient is a 48 year old male with past medical history of PE, hypertension, anxiety who presented to [facility] on 3/6 for swelling of his upper lip. He states he started to have welts after taking 3 doses of bactrim a few days ago. This skin rash continued to worsen and the lip swelling began last night prompting patient to come to the ED. CTA at that time showed PE in the right pulmonary artery. Patient saturating well on room air, no other respiratory symptoms and no signs of heart strain on echocardiogram. Blood pressure and pulse remain in good range. Patient states he had PE in 2021 which was attributed to COVID vaccine, only remained on anti-coagulation for about 6 weeks and has not taken any since. No provoking factors this time, no recent travel, no recent surgery, no history of malignancy, no family history of clot. Patient states his swelling and allergic reaction is better and he has no other complaints at this time. Pulmonology consulted for PE    No past medical history on file.    No past surgical history on file.    Social History     Socioeconomic History    Marital status:      No family history on file.    Drug Allergies:   Review of patient's allergies indicates:   Allergen Reactions    Bactrim [sulfamethoxazole-trimethoprim] Anaphylaxis     ANGIOEDEMA   Current Infusions:   heparin (porcine) in D5W 18 Units/kg/hr (03/06/24 4620)   Scheduled Medications:     diphenhydrAMINE  50 mg Intravenous ED 1 Time     PRN Medications:   heparin (PORCINE), heparin (PORCINE)    Review of Systems:   Review of Systems   Constitutional:  Negative for fever and malaise/fatigue.   Respiratory:  Negative for cough and shortness of breath.    Cardiovascular:  Negative for chest pain and leg swelling.   Gastrointestinal:  Negative for abdominal pain.   Genitourinary:  Negative for dysuria.   Neurological:  Negative for  weakness and headaches.   Psychiatric/Behavioral:  The patient is not nervous/anxious.      Vital Signs:    Vitals:    03/06/24 0741   BP:    Pulse: 69   Resp: 19   Temp:    Fluid Balance:     Intake/Output Summary (Last 24 hours) at 3/6/2024 0958  Last data filed at 3/6/2024 0735  Gross per 24 hour   Intake --   Output 800 ml   Net -800 ml     Physical Exam  Constitutional:       General: He is not in acute distress.     Appearance: Normal appearance.   HENT:      Head: Normocephalic and atraumatic.   Cardiovascular:      Rate and Rhythm: Normal rate and regular rhythm.      Pulses: Normal pulses.      Heart sounds: No murmur heard.     No friction rub.   Pulmonary:      Effort: No respiratory distress.      Breath sounds: Normal breath sounds. No wheezing.   Abdominal:      Palpations: Abdomen is soft.      Tenderness: There is no abdominal tenderness.   Musculoskeletal:      Right lower leg: No edema.      Left lower leg: No edema.      Comments: No calf tenderness   Neurological:      General: No focal deficit present.      Mental Status: He is alert and oriented to person, place, and time.       Laboratory Studies:     Recent Labs   Lab 03/06/24 0320   WBC 8.48   RBC 5.49   HGB 15.0   HCT 48.0      MCV 87.4   MCH 27.3   MCHC 31.3*     Recent Labs   Lab 03/05/24  2228   GLUCOSE 122*      K 3.9   CO2 25   BUN 5.3*   CREATININE 0.82   CALCIUM 9.2   Microbiology Data:   Microbiology Results (last 7 days)       Procedure Component Value Units Date/Time    Blood Culture #2 **CANNOT BE ORDERED STAT** [[id number]] Collected: 03/06/24 0402    Order Status: Resulted Specimen: Blood from Hand, Left Updated: 03/06/24 0405    Blood Culture #1 **CANNOT BE ORDERED STAT** [[id number]] Collected: 03/06/24 0320    Order Status: Resulted Specimen: Blood Updated: 03/06/24 0324        Imaging reviewed:  CTA Chest Non-Coronary (PE Studies)  START OF REPORT:  Technique: CT Scan of the chest was performed with  intravenous contrast with direct axial images as well as sagittal and coronal reconstruction images pulmonary embolus protocol.    Dosage Information: Automated Exposure Control was utilized.    Comparison: None.    Clinical History: Sob, r/o pe.    Findings:  Soft Tissues: Unremarkable.  Neck: The thyroid gland appear unremarkable.  Mediastinum: The mediastinal structures are within normal limits.  Heart: The heart size is within normal limits.  Aorta: Unremarkable appearing aorta. No aortic dissection or aneurysm is seen.  Pulmonary Arteries: There is a large defect in the right main pulmonary artery, which extends into the right lower lobe, lobar and proximal segmental divisions. This is consistent with pulmonary thromboembolism. No right ventricular strain is seen. The remainder of the pulmonary arteries are patent.  Lungs: The lungs are slightly heterogeneous, which may reflect small airways disease versus mosaic attenuation. Ill-defined ground-glass opacities are seen in the posterior aspect of the right upper lobe, which may reflect pneumonia. Streaky densities in the lingula of left upper lobe look like scar.  Pleura: Trace right pleural effusion is seen. No pneumothorax is seen.  Bony Structures:  Spine: Mild spondylolytic changes are seen in the thoracic spine.  Abdomen: The visualized upper abdominal organs appear unremarkable.    Notifications: The results were discussed with the emergency room physician Dr. Donaldson prior to dictation at 2024-03-06 00:47:51 CST.    Impression:  1. There is a large defect in the right main pulmonary artery, which extends into the right lower lobe, lobar and proximal segmental divisions. This is consistent with pulmonary thromboembolism. No right ventricular strain is seen.  2. The lungs are slightly heterogeneous, which may reflect small airways disease versus mosaic attenuation. Ill-defined ground-glass opacities are seen in the posterior aspect of the right upper lobe,  which may reflect pneumonia. Correlate with clinical and laboratory findings.  3. Trace right pleural effusion is seen.  4. Details and other findings as discussed above.    Assessment and Plan:    Assessment:  Large pulmonary embolus, right pulmonary artery. Unprovoked  History of prior PE in 2021, not on anticoagulation  Angioedema secondary to Bactrim- improvin   Hypertension  Anxiety    Plan:  - continue heparin drip for now  - saturating well on room air, can wean patient to room air  - can eventually be swtiched to DOAC which he will likely need indefinitely as this is his second PE.      Sascha Bethea, DO  3/6/2024  Pulmonology/Critical Care PGY3

## 2024-03-06 NOTE — NURSING
Nurses Note -- 4 Eyes      3/6/2024   11:03 AM      Skin assessed during: Admit      [x] No Altered Skin Integrity Present    []Prevention Measures Documented      [] Yes- Altered Skin Integrity Present or Discovered   [] LDA Added if Not in Epic (Describe Wound)   [] New Altered Skin Integrity was Present on Admit and Documented in LDA   [] Wound Image Taken    Wound Care Consulted? No    Attending Nurse:  Joselyn Freeman RN/Staff Member:   logan

## 2024-03-06 NOTE — CONSULTS
[facility] - Emergency Dept  Pulmonary Critical Care Note    Patient Name: Oracio Gold  MRN: [id number]  Admission Date: 3/5/2024  Hospital Length of Stay: 0 days  Code Status: No Order  Attending Provider: FLORIAN Urrutia MD  Primary Care Provider: Jessica Loyd NP     Subjective:     HPI:   A 48-year-old male with past medical history of hypertension, anxiety, prior pulmonary embolism in 2021 presented to the ED with lower lip swelling that began at 5:00 p.m. today.  One week ago patient fell in the shower and had a bruise on right lower extremity along with testicular swelling.  He went to the ED and was prescribed ciprofloxacin, patient states that he had right hand swelling 2 days after taking ciprofloxacin.  He then went to the ED again, was told that he had UTI and was given Bactrim, he has taken 3 pills since being prescribed.  Patient states that 2 days ago he began having hives, then yesterday at 5:00 p.m. he began having swelling of his lower lip.  In the ED, patient was given epinephrine with improvement in lower lip swelling.  Chest CTA was done revealing large right-sided pulmonary embolism.  Echocardiogram did not reveal any evidence of right heart strain.  Patient is saturating well on 4 L nasal cannula, normotensive, heart rate regular.      Hospital Course/Significant events:  03/06/2024:  Chest CTA revealed large right-sided pulmonary embolism.    24 Hour Interval History:      No past medical history on file.    No past surgical history on file.    Social History     Socioeconomic History    Marital status:            Current Outpatient Medications   Medication Instructions    ciprofloxacin HCl (CIPRO) 500 mg, Oral, 2 times daily    sulfamethoxazole-trimethoprim 200-40 mg/5 ml (BACTRIM,SEPTRA) 200-40 mg/5 mL Susp 8 mg/kg/day, Oral, Every 12 hours (non-standard times)       Current Inpatient Medications   cefTRIAXone (Rocephin) IV (PEDS and ADULTS)  1 g  Intravenous ED 1 Time    diphenhydrAMINE  50 mg Intravenous ED 1 Time       Current Intravenous Infusions   heparin (porcine) in D5W 18 Units/kg/hr (03/06/24 0228)         Review of Systems   Constitutional:  Negative for chills and fever.   HENT:          Positive for lower lip swelling.   Eyes:  Negative for blurred vision.   Respiratory:  Negative for cough, hemoptysis, shortness of breath and wheezing.    Cardiovascular:  Negative for chest pain, palpitations and leg swelling.   Gastrointestinal:  Negative for abdominal pain, diarrhea, nausea and vomiting.   Musculoskeletal:  Negative for myalgias.   Neurological:  Negative for dizziness and weakness.          Objective:       Intake/Output Summary (Last 24 hours) at 3/6/2024 0403  Last data filed at 3/6/2024 0005  Gross per 24 hour   Intake --   Output 300 ml   Net -300 ml         Vital Signs (Most Recent):  Temp: 98.5 °F (36.9 °C) (03/05/24 2211)  Pulse: 74 (03/06/24 0300)  Resp: 19 (03/06/24 0300)  BP: 115/72 (03/06/24 0300)  SpO2: 96 % (03/06/24 0300)  Body mass index is 32.49 kg/m².  Weight: 99.8 kg (220 lb) Vital Signs (24h Range):  Temp:  [98.5 °F (36.9 °C)] 98.5 °F (36.9 °C)  Pulse:  [74-92] 74  Resp:  [17-26] 19  SpO2:  [89 %-99 %] 96 %  BP: (109-148)/() 115/72     Physical Exam  Constitutional:       General: He is not in acute distress.  HENT:      Head: Normocephalic and atraumatic.      Mouth/Throat:      Comments: Mild lower lip edema.  No oropharyngeal edema.  Eyes:      Conjunctiva/sclera: Conjunctivae normal.      Pupils: Pupils are equal, round, and reactive to light.   Cardiovascular:      Rate and Rhythm: Normal rate and regular rhythm.      Heart sounds: No murmur heard.  Pulmonary:      Effort: Pulmonary effort is normal. No respiratory distress.      Breath sounds: Normal breath sounds. No wheezing, rhonchi or rales.   Abdominal:      General: Abdomen is flat. Bowel sounds are normal. There is no distension.      Palpations: Abdomen  "is soft.      Tenderness: There is no abdominal tenderness.   Musculoskeletal:      Cervical back: Neck supple. No rigidity or tenderness.      Right lower leg: No edema.      Left lower leg: No edema.   Neurological:      General: No focal deficit present.      Mental Status: He is alert and oriented to person, place, and time.           Lines/Drains/Airways       Peripheral Intravenous Line  Duration                  Peripheral IV - Single Lumen 03/05/24 2227 20 G Distal;Posterior;Right Forearm <1 day         Peripheral IV - Single Lumen 03/05/24 2230 20 G Posterior;Right Hand <1 day                    Significant Labs:    Lab Results   Component Value Date    WBC 8.48 03/06/2024    HGB 15.0 03/06/2024    HCT 48.0 03/06/2024    MCV 87.4 03/06/2024     03/06/2024           BMP  Lab Results   Component Value Date     03/05/2024    K 3.9 03/05/2024    CHLORIDE 104 03/05/2024    CO2 25 03/05/2024    BUN 5.3 (L) 03/05/2024    CREATININE 0.82 03/05/2024    CALCIUM 9.2 03/05/2024    EGFRNONAA >60 09/20/2021         ABG  No results for input(s): "PH", "PO2", "PCO2", "HCO3", "POCBASEDEF" in the last 168 hours.    Mechanical Ventilation Support:         Significant Imaging:  I have reviewed the pertinent imaging within the past 24 hours.        Assessment/Plan:     Assessment  Right-sided pulmonary embolus  Angioedema likely secondary to Bactrim- improving s/p epinephrine  History of pulmonary embolism in 2021  Hypertension  Anxiety      Plan  Continue heparin drip  Wean oxygen as tolerated.  Monitor for continued improvement of angioedema.  CTA chest revealed large right-sided pulmonary embolism.  Echocardiogram did not reveal evidence of right heart strain.  Patient is saturating well on 4 L nasal cannula, he is currently asymptomatic.  He is normotensive with normal heart rate.  Not requiring ICU level of care.  Agree of floor admission.      Discussed with attending on-call: Dr. Abbott.         Curt " MD Darcie  Pulmonary Critical Care Medicine  [facility] - Emergency Dept  DOS: 03/06/2024

## 2024-03-06 NOTE — ED PROVIDER NOTES
Encounter Date: 3/5/2024    SCRIBE #1 NOTE: I, Bennett Pastor, am scribing for, and in the presence of,  Zeyad Donaldson MD. I have scribed the following portions of the note - Other sections scribed: HPI, ROS, PE.       History     Chief Complaint   Patient presents with    Angioedema     Pt started bactrim for UTI on Sunday, rash started on Monday , lower lip and jaw swelling started at 5pm. Pt denies sob.      49 y/o male with a hx of HTN and anxiety presents to the ED for swelling of the lower lip beginning today at 1700. Pt states he was placed on Bactrim for a UTI and has only taken 3 pills since prescribed. He notes he was treated for swollen testicles on Friday, 3/1, but those symptoms have resolved. He states he developed a rash on Monday, 3/4 prior to the swelling of his lower lip. Pt notes he has no SOB, sore throat, or difficulty swallowing.     The history is provided by the patient. No  was used.     Review of patient's allergies indicates:   Allergen Reactions    Bactrim [sulfamethoxazole-trimethoprim] Anaphylaxis     ANGIOEDEMA     No past medical history on file.  No past surgical history on file.  No family history on file.     Review of Systems   Constitutional:  Negative for chills and fever.   HENT:  Negative for sore throat and trouble swallowing.         Swelling to the lower lip   Respiratory:  Negative for cough and shortness of breath.    Cardiovascular:  Negative for chest pain.   Gastrointestinal:  Negative for abdominal pain, nausea and vomiting.   Musculoskeletal:  Negative for myalgias.   Skin:  Positive for rash.   Neurological:  Negative for syncope.   All other systems reviewed and are negative.      Physical Exam     Initial Vitals [03/05/24 2211]   BP Pulse Resp Temp SpO2   (!) 147/90 92 20 98.5 °F (36.9 °C) 98 %      MAP       --         Physical Exam    Constitutional: He appears well-developed and well-nourished. No distress.   HENT:   Head: Normocephalic  and atraumatic.   Swelling to the lower lip. No swelling to the floor of the mouth or throat.    Cardiovascular:  Normal rate.           Pulmonary/Chest: No respiratory distress. He has no wheezes. He has no rhonchi. He exhibits no tenderness.   Abdominal: Abdomen is soft. He exhibits no distension. There is no abdominal tenderness. There is no rebound and no guarding.   Musculoskeletal:         General: Normal range of motion.     Neurological: He is alert and oriented to person, place, and time. He has normal strength.   Skin: Skin is warm and dry. Rash noted.   Hives with erythema to the lower abdomen and upper extremities.    Psychiatric: He has a normal mood and affect.         ED Course   Critical Care    Date/Time: 3/6/2024 5:46 AM    Performed by: Jessica Moody MD  Authorized by: Jessica Moody MD  Direct patient critical care time: 45 minutes  Total critical care time (exclusive of procedural time) : 45 minutes  Critical care time was exclusive of separately billable procedures and treating other patients.  Critical care was necessary to treat or prevent imminent or life-threatening deterioration of the following conditions: respiratory failure.  Critical care was time spent personally by me on the following activities: blood draw for specimens, development of treatment plan with patient or surrogate, discussions with consultants, evaluation of patient's response to treatment, examination of patient, obtaining history from patient or surrogate, ordering and performing treatments and interventions, ordering and review of laboratory studies, ordering and review of radiographic studies, re-evaluation of patient's condition and review of old charts.        Labs Reviewed   COMPREHENSIVE METABOLIC PANEL - Abnormal; Notable for the following components:       Result Value    Glucose Level 122 (*)     Blood Urea Nitrogen 5.3 (*)     Globulin 3.6 (*)     Albumin/Globulin Ratio 1.0 (*)     All other components  within normal limits   CBC WITH DIFFERENTIAL - Abnormal; Notable for the following components:    WBC 12.03 (*)     MCH 26.8 (*)     MCHC 31.5 (*)     RDW 17.6 (*)     All other components within normal limits   MANUAL DIFFERENTIAL - Abnormal; Notable for the following components:    Basophils Abs 0.2406 (*)     RBC Morph Abnormal (*)     Polychromasia 1+ (*)     Macrocytosis 1+ (*)     All other components within normal limits   TROPONIN I - Abnormal; Notable for the following components:    Troponin-I 0.234 (*)     All other components within normal limits   PROTIME-INR - Abnormal; Notable for the following components:    PT 14.7 (*)     All other components within normal limits   CBC WITH DIFFERENTIAL - Abnormal; Notable for the following components:    MCHC 31.3 (*)     Lymph # 0.50 (*)     All other components within normal limits   B-TYPE NATRIURETIC PEPTIDE - Normal   APTT - Normal   COVID/FLU A&B PCR - Normal    Narrative:     The Xpert Xpress SARS-CoV-2/FLU/RSV plus is a rapid, multiplexed real-time PCR test intended for the simultaneous qualitative detection and differentiation of SARS-CoV-2, Influenza A, Influenza B, and respiratory syncytial virus (RSV) viral RNA in either nasopharyngeal swab or nasal swab specimens.         LACTIC ACID, PLASMA - Normal   TROPONIN I - Normal   BLOOD CULTURE [facility]   BLOOD CULTURE [facility]   CBC W/ AUTO DIFFERENTIAL    Narrative:     The following orders were created for panel order CBC auto differential.  Procedure                               Abnormality         Status                     ---------                               -----------         ------                     CBC with Differential[[id number]]       Abnormal            Final result               Manual Differential[[id number]]         Abnormal            Final result                 Please view results for these tests on the individual orders.   CBC W/ AUTO DIFFERENTIAL    Narrative:     The following orders were  created for panel order CBC auto differential.  Procedure                               Abnormality         Status                     ---------                               -----------         ------                     CBC with Differential[[id number]]       Abnormal            Final result                 Please view results for these tests on the individual orders.   TYPE & SCREEN   ABORH RETYPE          Imaging Results               CTA Chest Non-Coronary (PE Studies) (Final result)  Result time 03/06/24 10:49:32      Final result by Zoe Teresa MD (03/06/24 10:49:32)                   Impression:      1. There is a large defect in the right main pulmonary artery, which extends into the right lower lobe, lobar and proximal segmental divisions. This is consistent with pulmonary thromboembolism. No right ventricular strain is seen.    2. The lungs are slightly heterogeneous, which may reflect small airways disease versus mosaic attenuation. Ill-defined ground-glass opacities are seen in the posterior aspect of the right upper lobe, which may reflect pneumonia. Correlate with clinical and laboratory findings.    3. Trace right pleural effusion is seen.    4. Details and other findings as discussed above.    There is general concurrence with the preliminary report with slight discrepancies.  The pulmonary embolism  also extends into the right upper lobe pulmonary arteries.  The area of patchy infiltrate in the right upper lobe could represent developing pulmonary infarct.    This report was flagged in Epic as abnormal.      Electronically signed by: Tano Teresa  Date:    03/06/2024  Time:    10:49               Narrative:    EXAMINATION:  CTA CHEST NON CORONARY (PE STUDIES)    CLINICAL HISTORY:  Pulmonary embolism (PE) suspected, high prob;    TECHNIQUE:  Low dose axial images, sagittal and coronal reformations were obtained from the thoracic inlet to the lung bases following the IV administration of  contrast..  Contrast timing was optimized to evaluate the pulmonary arteries.  MIP images were performed.  Automated exposure control was utilized    COMPARISON:  None    FINDINGS:  Soft Tissues: Unremarkable    Neck: The thyroid gland appear unremarkable.    Mediastinum: The mediastinal structures are within normal limits.    Heart: The heart size is within normal limits.    Aorta: Unremarkable appearing aorta. No aortic dissection or aneurysm is seen.    Pulmonary Arteries: There is a large defect in the right main pulmonary artery, which extends into the right lower lobe, lobar and proximal segmental divisions. This is consistent with pulmonary thromboembolism. No right ventricular strain is seen. The remainder of the pulmonary arteries are patent    Lungs: The lungs are slightly heterogeneous, which may reflect small airways disease versus mosaic attenuation. Ill-defined ground-glass opacities are seen in the posterior aspect of the right upper lobe, which may reflect pneumonia. Streaky densities in the lingula of left upper lobe look like scar.    Pleura: Trace right pleural effusion is seen. No pneumothorax is seen.    Bony Structures:Spine: Mild spondylolytic changes are seen in the thoracic spine.    Abdomen: The visualized upper abdominal organs appear unremarkable.    Notifications: The results were discussed with the emergency room physician Dr. Donaldson prior to dictation at 2024-03-06 00:47:51 CST.                        Preliminary result by Emeterio Lopez Jr., MD (03/06/24 00:49:02)                   Impression:    1. There is a large defect in the right main pulmonary artery, which extends into the right lower lobe, lobar and proximal segmental divisions. This is consistent with pulmonary thromboembolism. No right ventricular strain is seen.  2. The lungs are slightly heterogeneous, which may reflect small airways disease versus mosaic attenuation. Ill-defined ground-glass opacities are seen  in the posterior aspect of the right upper lobe, which may reflect pneumonia. Correlate with clinical and laboratory findings.  3. Trace right pleural effusion is seen.  4. Details and other findings as discussed above.               Narrative:    START OF REPORT:  Technique: CT Scan of the chest was performed with intravenous contrast with direct axial images as well as sagittal and coronal reconstruction images pulmonary embolus protocol.    Dosage Information: Automated Exposure Control was utilized.    Comparison: None.    Clinical History: Sob, r/o pe.    Findings:  Soft Tissues: Unremarkable.  Neck: The thyroid gland appear unremarkable.  Mediastinum: The mediastinal structures are within normal limits.  Heart: The heart size is within normal limits.  Aorta: Unremarkable appearing aorta. No aortic dissection or aneurysm is seen.  Pulmonary Arteries: There is a large defect in the right main pulmonary artery, which extends into the right lower lobe, lobar and proximal segmental divisions. This is consistent with pulmonary thromboembolism. No right ventricular strain is seen. The remainder of the pulmonary arteries are patent.  Lungs: The lungs are slightly heterogeneous, which may reflect small airways disease versus mosaic attenuation. Ill-defined ground-glass opacities are seen in the posterior aspect of the right upper lobe, which may reflect pneumonia. Streaky densities in the lingula of left upper lobe look like scar.  Pleura: Trace right pleural effusion is seen. No pneumothorax is seen.  Bony Structures:  Spine: Mild spondylolytic changes are seen in the thoracic spine.  Abdomen: The visualized upper abdominal organs appear unremarkable.    Notifications: The results were discussed with the emergency room physician Dr. Donaldson prior to dictation at 2024-03-06 00:47:51 CST.                                         Medications   diphenhydrAMINE injection 50 mg (50 mg Intravenous Not Given 3/5/24 2230)    heparin 25,000 units in dextrose 5% 250 mL (100 units/mL) infusion HIGH INTENSITY nomogram - [facility] (21 Units/kg/hr × 82.3 kg (Adjusted) Intravenous New Bag 3/6/24 1206)   heparin 25,000 units in dextrose 5% (100 units/ml) IV bolus from bag HIGH INTENSITY nomogram - [facility] (4,938 Units Intravenous Bolus from Bag 3/6/24 1139)   heparin 25,000 units in dextrose 5% (100 units/ml) IV bolus from bag HIGH INTENSITY nomogram - [facility]AF (has no administration in time range)   famotidine (PF) injection 40 mg (40 mg Intravenous Given 3/5/24 2228)   diphenhydrAMINE (BENADRYL) 50 mg/mL injection (  Given 3/5/24 2225)   methylPREDNISolone sodium succinate injection 125 mg (125 mg Intravenous Given 3/5/24 2225)   EPINEPHrine (EPIPEN) 0.3 mg/0.3 mL pen injection 0.3 mg (0.3 mg Intramuscular Given 3/6/24 0005)   iohexoL (OMNIPAQUE 350) injection 100 mL (100 mLs Intravenous Given 3/6/24 0021)   heparin 25,000 units in dextrose 5% (100 units/ml) IV bolus from bag HIGH INTENSITY nomogram - LAF (6,584 Units Intravenous Bolus from Bag 3/6/24 0228)   cefTRIAXone (Rocephin) 1 g in dextrose 5 % in water (D5W) 100 mL IVPB (MB+) (0 g Intravenous Stopped 3/6/24 0418)     Medical Decision Making  Differential diagnosis includes but is not limited to: angioedema, anaphylaxis, allergic reaction      Amount and/or Complexity of Data Reviewed  Labs: ordered.  Radiology: ordered.    Risk  Prescription drug management.  Decision regarding hospitalization.            Scribe Attestation:   Scribe #1: I performed the above scribed service and the documentation accurately describes the services I performed. I attest to the accuracy of the note.    Attending Attestation:           Physician Attestation for Scribe:  Physician Attestation Statement for Scribe #1: I, Zeyad Donaldson MD, reviewed documentation, as scribed by Bennett Pastor in my presence, and it is both accurate and complete.             ED Course as of 03/06/24 1447   Tue Mar 05, 2024   5025  Swelling is decreasing feeling better.  [LF]   2351 Discussed this could be angioedema could be allergic reaction to the Bactrim which she will not take any longer [LF]   Wed Mar 06, 2024   0001 Patient does have some mild expiratory wheezing at present with his oxygen dropping to 88 on room air anaphylaxis felt to be more likely cause of his issues.  He is already received steroids Benadryl Pepcid will give epinephrine and continue to monitor for 4 hours after injection.  He confirms in his lips swelling is going down he still has no swelling of the tongue or floor of the mouth does not feel short of breath does not feel any swelling in his oropharynx.  He has had a pulmonary embolus in the past but they believe is related to a COVID vaccine he was not on anticoagulation and did not have any provoked issue at that time.  Will get CT angio for further evaluation [RB]   0014 I have seen and evaluated this patient after shift change.  Chart reviewed, labs reviewed.  He is currently going to CTA to assess for PE.  He just received epinephrine.  He appears very comfortable with even, unlabored respirations at this time, speaking in full sentences.  He is on oxygen 4LPM, oxygen saturation 99%.  He tells me as well at his lip swelling has improved.  Hives have improved. He has no acute complaints.  We will continue to monitor. [RB]   0216 Patient is resting comfortably.  Still no complaints.  Oxygen saturation 93% on oxygen.  CTA concerning for large right pulmonary artery embolism extending into the right lower lobe, lobar and proximal segmental divisions.  There is also ill-defined ground-glass opacities in the posterior RUL, possibly pneumonia versus infarct. Blood cultures sent, Rocephin ordered to cover pneumonia and reported UTI. Bactrim discontinued.  Echo ordered with troponin and BNP. Troponin mildly elevated. EKG with early repolarization, no PHI. BNP wnl, echo negative for evidence of right heart strain. I  spoke to Dr. Bocanegra, on call for vascular intervention as this patient had ECOS in the past for PE. As the patient has no heart strain, no indication for ECOS. It is still a large clot burden. ICU consulted for evaluation and admission for close monitoring. [facility] to be consulted as well.  [RB]   0404 ICU evaluated the patient. Agrees with heparin drip. Patient does not meet ICU criteria. Dr. Quintana to admit. I spoke to Demetrius NP, with [facility]. No further recommendations. Patient remains stable, asymptomatic, on oxygen and heparin gtt. He is amenable to the plan of care.   [RB]   0945 US APURVA, SUSANNAH negative for DVT [RB]      ED Course User Index  [LF] Zeyad Donaldson MD  [RB] Jessica Moody MD                           Clinical Impression:  Final diagnoses:  [T78.2XXA] Anaphylaxis, initial encounter  [I26.99] Pulmonary emboli  [I26.99] Pulmonary embolism (Primary)  [R09.02] Hypoxia  [J96.01] Acute respiratory failure with hypoxia  [R06.00] Dyspnea          ED Disposition Condition    Admit Serious                Jessica Moody MD  03/06/24 8232       Jessica Moody MD  03/06/24 7291

## 2024-03-07 VITALS
WEIGHT: 213.19 LBS | DIASTOLIC BLOOD PRESSURE: 83 MMHG | BODY MASS INDEX: 31.58 KG/M2 | RESPIRATION RATE: 16 BRPM | HEIGHT: 69 IN | SYSTOLIC BLOOD PRESSURE: 152 MMHG | OXYGEN SATURATION: 96 % | HEART RATE: 75 BPM | TEMPERATURE: 98 F

## 2024-03-07 PROBLEM — I26.99 PULMONARY EMBOLISM: Status: ACTIVE | Noted: 2024-03-07

## 2024-03-07 LAB
APTT PPP: 52.2 SECONDS (ref 23.2–33.7)
BASOPHILS # BLD AUTO: 0.05 X10(3)/MCL
BASOPHILS NFR BLD AUTO: 0.4 %
EOSINOPHIL # BLD AUTO: 0.16 X10(3)/MCL (ref 0–0.9)
EOSINOPHIL NFR BLD AUTO: 1.4 %
ERYTHROCYTE [DISTWIDTH] IN BLOOD BY AUTOMATED COUNT: 16.3 % (ref 11.5–17)
HCT VFR BLD AUTO: 45.5 % (ref 42–52)
HGB BLD-MCNC: 14.5 G/DL (ref 14–18)
IMM GRANULOCYTES # BLD AUTO: 0.05 X10(3)/MCL (ref 0–0.04)
IMM GRANULOCYTES NFR BLD AUTO: 0.4 %
LYMPHOCYTES # BLD AUTO: 3.34 X10(3)/MCL (ref 0.6–4.6)
LYMPHOCYTES NFR BLD AUTO: 28.5 %
MCH RBC QN AUTO: 27.4 PG (ref 27–31)
MCHC RBC AUTO-ENTMCNC: 31.9 G/DL (ref 33–36)
MCV RBC AUTO: 85.8 FL (ref 80–94)
MONOCYTES # BLD AUTO: 1.09 X10(3)/MCL (ref 0.1–1.3)
MONOCYTES NFR BLD AUTO: 9.3 %
NEUTROPHILS # BLD AUTO: 7.01 X10(3)/MCL (ref 2.1–9.2)
NEUTROPHILS NFR BLD AUTO: 60 %
NRBC BLD AUTO-RTO: 0 %
PLATELET # BLD AUTO: 218 X10(3)/MCL (ref 130–400)
PMV BLD AUTO: 9.5 FL (ref 7.4–10.4)
RBC # BLD AUTO: 5.3 X10(6)/MCL (ref 4.7–6.1)
WBC # SPEC AUTO: 11.7 X10(3)/MCL (ref 4.5–11.5)

## 2024-03-07 PROCEDURE — 85025 COMPLETE CBC W/AUTO DIFF WBC: CPT | Performed by: EMERGENCY MEDICINE

## 2024-03-07 PROCEDURE — 25000003 PHARM REV CODE 250: Performed by: INTERNAL MEDICINE

## 2024-03-07 PROCEDURE — 63600175 PHARM REV CODE 636 W HCPCS: Performed by: EMERGENCY MEDICINE

## 2024-03-07 PROCEDURE — 85730 THROMBOPLASTIN TIME PARTIAL: CPT | Performed by: INTERNAL MEDICINE

## 2024-03-07 RX ORDER — DICLOFENAC SODIUM 50 MG/1
50 TABLET, DELAYED RELEASE ORAL 3 TIMES DAILY
Qty: 15 TABLET | Refills: 0 | Status: SHIPPED | OUTPATIENT
Start: 2024-03-07 | End: 2024-03-12

## 2024-03-07 RX ADMIN — AMLODIPINE BESYLATE 10 MG: 5 TABLET ORAL at 08:03

## 2024-03-07 RX ADMIN — CITALOPRAM HYDROBROMIDE 10 MG: 10 TABLET ORAL at 08:03

## 2024-03-07 RX ADMIN — LISINOPRIL 40 MG: 10 TABLET ORAL at 08:03

## 2024-03-07 RX ADMIN — HEPARIN SODIUM 25 UNITS/KG/HR: 10000 INJECTION, SOLUTION INTRAVENOUS at 05:03

## 2024-03-07 RX ADMIN — PANTOPRAZOLE SODIUM 40 MG: 40 TABLET, DELAYED RELEASE ORAL at 08:03

## 2024-03-07 NOTE — DISCHARGE SUMMARY
[facility] - 6th [facility]  Discharge Summary      Patient Name: Oracio Gold  MRN: [id number]  Admission Date: 3/5/2024  Hospital Length of Stay: 1 days  Discharge Date and Time:  03/07/2024 10:09 AM  Attending Physician: Cody Mott*   Discharging Provider: Cody Mott MD  Discharge Provider Team: Networked reference to record PCT   Primary Care Provider: Jessica Loyd NP        HPI: Patient came with lower lip swelling, dx with allergic rxtion to Bactrim. During the evaluation CT of the chest showed a large right pulmonary artery defect sugestive of PE. Patient was admitted and IV Heparin strated, cardio and pulmonary were consulted. Lower extremity NIVA showed no DVT.     * No surgery found *      Hospital Course: Patient was placed on IV Heparin by [facility], pulmonary was consulted and agreed with recommendations, they also ask the patient if he was agreeable for possible thrombectomy and he declined. Patient was transition to Eliquis PO started on 10 mg BID for 1 week then to decreased to 5 mg BID. Appointment for my office in 1 week to follow up. Patient was released by cardio and pulmonary to D/C. He left in good health. Prescription given to patient.    Consults:   Consults (From admission, onward)          Status Ordering Provider     Inpatient consult to Cardiology  Once        Provider:  Cosat Stone MD    Completed CODY MOTT     Inpatient consult to Pulmonology  Once        Provider:  ELMA Jimenez MD    Completed JESSICA BRANHAM            Final Active Diagnoses:    Diagnosis Date Noted POA    PRINCIPAL PROBLEM:  Pulmonary embolism [I26.99] 03/07/2024 Yes      Problems Resolved During this Admission:      Discharged Condition: good    Disposition: Home or Self Care    Follow Up:    Patient Instructions:      Diet Adult Regular     Activity as tolerated     Medications:  Reconciled Home Medications:       Medication List        START taking these medications      * apixaban 5 mg Tab  Commonly known as: ELIQUIS  Take 2 tablets (10 mg total) by mouth 2 (two) times daily.     * apixaban 5 mg Tab  Commonly known as: ELIQUIS  Take 1 tablet (5 mg total) by mouth 2 (two) times daily.     * apixaban 5 mg Tab  Commonly known as: ELIQUIS  Take 1 tablet (5 mg total) by mouth 2 (two) times daily.  Start taking on: March 14, 2024           * This list has 3 medication(s) that are the same as other medications prescribed for you. Read the directions carefully, and ask your doctor or other care provider to review them with you.                CONTINUE taking these medications      amLODIPine-benazepriL 10-40 mg per capsule  Commonly known as: LOTREL  Take 1 capsule by mouth once daily.     ciprofloxacin HCl 500 MG tablet  Commonly known as: CIPRO  Take 1 tablet (500 mg total) by mouth 2 (two) times daily. for 10 days     citalopram 10 MG tablet  Commonly known as: CeleXA  Take 10 mg by mouth once daily.     diclofenac 50 MG EC tablet  Commonly known as: VOLTAREN  Take 1 tablet (50 mg total) by mouth 3 (three) times daily. for 5 days     pantoprazole 40 MG tablet  Commonly known as: PROTONIX  Take 40 mg by mouth once daily.              Significant Diagnostic Studies: Labs: CMP   Recent Labs   Lab 03/05/24 2228      K 3.9   CO2 25   BUN 5.3*   CREATININE 0.82   CALCIUM 9.2   ALBUMIN 3.6   BILITOT 1.1   ALKPHOS 79   AST 21   ALT 17    and CBC   Recent Labs   Lab 03/05/24  2228 03/06/24  0320 03/07/24  0431   WBC 12.03  12.03* 8.48 11.70*   HGB 16.0 15.0 14.5   HCT 50.8 48.0 45.5    184 218       Pending Diagnostic Studies:       Procedure Component Value Units Date/Time    APTT [[id number]]     Order Status: Sent Lab Status: No result     Specimen: Blood           Indwelling Lines/Drains at time of discharge:   Lines/Drains/Airways       None                   Time spent on the discharge of patient: 45 minutes          Cody Moncada MD  Department of Hospital Medicine  [facility] - 6th Floor Medical Telemetry

## 2024-03-07 NOTE — PROGRESS NOTES
"  [facility] - 6th Floor Medical Telemetry    Cardiology  Progress Note    Patient Name: Oracio Gold  MRN: [id number]  Admission Date: 3/5/2024  Hospital Length of Stay: 1 days  Code Status: No Order   Attending Provider: Cody Moncada*   Consulting Provider: TOMMIE Lawson  Primary Care Physician: Jessica Loyd NP  Principal Problem:Pulmonary embolism    Patient information was obtained from patient and ER records.     Subjective:     Reason for Consult: PE    HPI: Mr. Gold is a 48 year old male who is previously known to [facility], Dr. Kasper (2021). He presents to the ER with complaints of upper lip swelling. He reports that he began to have a skin rash, welts, & lip swelling that began last night after taking 3 doses of bactrim over the last few days. He denies CP, SOB, palpitations, nausea, or vomiting. Significant labs include WBC 12.03, Trop 0.234 -> <0.010. A CTA chest was obtained and demonstrated a large defect in the right main pulmonary artery, which extends into the right lower lobe, lobar and proximal segmental divisions without evidence of RV strain and possible PNA. He denies any provoking  factors, such as recent travel, recent surgery, history of CA, or family hx. Of note, the patient was diagnosed with a PE in 2021 and was on oral anticoagulation x 6 weeks. [facility] has been consulted to further evaluate the patient's PE.     3.7.24: NAD. Denies CP, SOB, or palps. On RA. "I feel great." SR on tele.     PMH: PE, HTN, anxiety   PSH: excision of soft tissue  Family History: Father - CABG x 4, irregular heart rate  Social History: Every day tobacco use. Denies alcohol or illicit drug use.     Previous Cardiac Diagnostics:   CTA Chest 3.6.24:  There is a large defect in the right main pulmonary artery, which extends into the right lower lobe, lobar and proximal segmental divisions. This is consistent with pulmonary thromboembolism. No right ventricular strain " is seen.  The lungs are slightly heterogeneous, which may reflect small airways disease versus mosaic attenuation. Ill-defined ground-glass opacities are seen in the posterior aspect of the right upper lobe, which may reflect pneumonia. Correlate with clinical and laboratory findings.  Trace right pleural effusion is seen.    BLE NIVA (9.16.21):  Positive acute deep vein thrombosis was identified in the left common femoral, sapheno-femoral junction, superficial  femoral and popliteal veins.  Superficial thrombosis was identified in the left above and below knee greater saphenous vein.     Review of patient's allergies indicates:   Allergen Reactions    Bactrim [sulfamethoxazole-trimethoprim] Anaphylaxis     ANGIOEDEMA     No current facility-administered medications on file prior to encounter.     Current Outpatient Medications on File Prior to Encounter   Medication Sig    amLODIPine-benazepriL (LOTREL) 10-40 mg per capsule Take 1 capsule by mouth once daily.    ciprofloxacin HCl (CIPRO) 500 MG tablet Take 1 tablet (500 mg total) by mouth 2 (two) times daily. for 10 days    citalopram (CELEXA) 10 MG tablet Take 10 mg by mouth once daily.    pantoprazole (PROTONIX) 40 MG tablet Take 40 mg by mouth once daily.    [DISCONTINUED] diclofenac (VOLTAREN) 50 MG EC tablet Take 1 tablet (50 mg total) by mouth 3 (three) times daily. for 5 days     Review of Systems   Respiratory:  Negative for shortness of breath.    Cardiovascular:  Negative for chest pain and palpitations.       Objective:     Vital Signs (Most Recent):  Temp: 98.1 °F (36.7 °C) (03/07/24 0825)  Pulse: 75 (03/07/24 0825)  Resp: 16 (03/07/24 0825)  BP: (!) 152/83 (03/07/24 0825)  SpO2: 96 % (03/07/24 0825) Vital Signs (24h Range):  Temp:  [97.7 °F (36.5 °C)-98.1 °F (36.7 °C)] 98.1 °F (36.7 °C)  Pulse:  [69-80] 75  Resp:  [16-18] 16  SpO2:  [89 %-96 %] 96 %  BP: (137-152)/(78-94) 152/83   Weight: 96.7 kg (213 lb 3.2 oz)  Body mass index is 31.48 kg/m².  SpO2:  96 %       Intake/Output Summary (Last 24 hours) at 3/7/2024 1113  Last data filed at 3/6/2024 2152  Gross per 24 hour   Intake 800 ml   Output 600 ml   Net 200 ml       Lines/Drains/Airways       Peripheral Intravenous Line  Duration                  Peripheral IV - Single Lumen 03/05/24 2227 20 G Distal;Posterior;Right Forearm 1 day         Peripheral IV - Single Lumen 03/05/24 2230 20 G Posterior;Right Hand 1 day                  Significant Labs:  Recent Results (from the past 72 hour(s))   Comprehensive metabolic panel    Collection Time: 03/05/24 10:28 PM   Result Value Ref Range    Sodium Level 136 136 - 145 mmol/L    Potassium Level 3.9 3.5 - 5.1 mmol/L    Chloride 104 98 - 107 mmol/L    Carbon Dioxide 25 22 - 29 mmol/L    Glucose Level 122 (H) 74 - 100 mg/dL    Blood Urea Nitrogen 5.3 (L) 8.9 - 20.6 mg/dL    Creatinine 0.82 0.73 - 1.18 mg/dL    Calcium Level Total 9.2 8.4 - 10.2 mg/dL    Protein Total 7.2 6.4 - 8.3 gm/dL    Albumin Level 3.6 3.5 - 5.0 g/dL    Globulin 3.6 (H) 2.4 - 3.5 gm/dL    Albumin/Globulin Ratio 1.0 (L) 1.1 - 2.0 ratio    Bilirubin Total 1.1 <=1.5 mg/dL    Alkaline Phosphatase 79 40 - 150 unit/L    Alanine Aminotransferase 17 0 - 55 unit/L    Aspartate Aminotransferase 21 5 - 34 unit/L    eGFR >60 mls/min/1.73/m2   Type & Screen    Collection Time: 03/05/24 10:28 PM   Result Value Ref Range    Group & Rh A POS     Indirect Rylee GEL NEG     Specimen Outdate 03/08/2024 23:59    CBC with Differential    Collection Time: 03/05/24 10:28 PM   Result Value Ref Range    WBC 12.03 (H) 4.50 - 11.50 x10(3)/mcL    RBC 5.96 4.70 - 6.10 x10(6)/mcL    Hgb 16.0 14.0 - 18.0 g/dL    Hct 50.8 42.0 - 52.0 %    MCV 85.2 80.0 - 94.0 fL    MCH 26.8 (L) 27.0 - 31.0 pg    MCHC 31.5 (L) 33.0 - 36.0 g/dL    RDW 17.6 (H) 11.5 - 17.0 %    Platelet 189 130 - 400 x10(3)/mcL    MPV 10.2 7.4 - 10.4 fL    NRBC% 0.0 %   Manual Differential    Collection Time: 03/05/24 10:28 PM   Result Value Ref Range    WBC 12.03  x10(3)/mcL    Neutrophils % 62 %    Lymphs % 25 %    Monocytes % 7 %    Eosinophils % 4 %    Basophils % 2 %    nRBC % 1 %    Neutrophils Abs 7.4586 2.1 - 9.2 x10(3)/mcL    Lymphs Abs 3.0075 0.6 - 4.6 x10(3)/mcL    Monocytes Abs 0.8421 0.1 - 1.3 x10(3)/mcL    Eosinophils Abs 0.4812 0 - 0.9 x10(3)/mcL    Basophils Abs 0.2406 (H) 0 - 0.2 x10(3)/mcL    Platelets Normal Normal, Adequate    RBC Morph Abnormal (A) Normal    Polychromasia 1+ (A) (none)    Macrocytosis 1+ (A) (none)   Troponin I    Collection Time: 03/05/24 10:28 PM   Result Value Ref Range    Troponin-I 0.234 (H) 0.000 - 0.045 ng/mL   Brain natriuretic peptide    Collection Time: 03/06/24 12:51 AM   Result Value Ref Range    Natriuretic Peptide <10.0 <=100.0 pg/mL   ABORH RETYPE    Collection Time: 03/06/24 12:51 AM   Result Value Ref Range    ABORH Retype A POS    Echo    Collection Time: 03/06/24  1:44 AM   Result Value Ref Range    BSA 2.2 m2    LVOT stroke volume 69.08 cm3    LVIDd 4.88 3.5 - 6.0 cm    LV Systolic Volume 52.60 mL    LV Systolic Volume Index 24.5 mL/m2    LVIDs 3.55 2.1 - 4.0 cm    LV Diastolic Volume 112.00 mL    LV Diastolic Volume Index 52.09 mL/m2    IVS 1.01 0.6 - 1.1 cm    LVOT diameter 2.00 cm    LVOT area 3.1 cm2    FS 27 (A) 28 - 44 %    Left Ventricle Relative Wall Thickness 0.43 cm    Posterior Wall 1.05 0.6 - 1.1 cm    LV mass 182.01 g    LV Mass Index 85 g/m2    MV Peak E Kanu 0.49 m/s    TDI LATERAL 0.16 m/s    TDI SEPTAL 0.08 m/s    E/E' ratio 4.08 m/s    MV Peak A Kanu 1.07 m/s    TR Max Kanu 1.84 m/s    E/A ratio 0.46     E wave deceleration time 177.00 msec    LV SEPTAL E/E' RATIO 6.13 m/s    LV LATERAL E/E' RATIO 3.06 m/s    LVOT peak kanu 1.21 m/s    Left Ventricular Outflow Tract Mean Velocity 0.83 cm/s    Left Ventricular Outflow Tract Mean Gradient 3.00 mmHg    RV mid diameter 2.86 cm    RV S' 16.10 cm/s    TAPSE 2.68 cm    AV mean gradient 7 mmHg    AV peak gradient 13 mmHg    Ao peak kanu 1.79 m/s    Ao VTI 29.30 cm     LVOT peak VTI 22.00 cm    AV valve area 2.36 cm²    AV Velocity Ratio 0.68     AV index (prosthetic) 0.75     OSCAR by Velocity Ratio 2.12 cm²    Mr max tala 1.66 m/s    MV mean gradient 2 mmHg    MV peak gradient 7 mmHg    MV stenosis pressure 1/2 time 67.00 ms    MV valve area p 1/2 method 3.28 cm2    MV valve area by continuity eq 2.53 cm2    MV VTI 27.3 cm    Triscuspid Valve Regurgitation Peak Gradient 14 mmHg    PV PEAK VELOCITY 1.51 m/s    PV peak gradient 9 mmHg    Ao root annulus 3.70 cm    Mean e' 0.12 m/s    ZLVIDS -1.43     ZLVIDD -3.58     AORTIC VALVE CUSP SEPERATION 2.20 cm    TV resting pulmonary artery pressure 22 mmHg    RV TB RVSP 10 mmHg    Est. RA pres 8 mmHg   COVID/FLU A&B PCR    Collection Time: 03/06/24  2:37 AM   Result Value Ref Range    Influenza A PCR Not Detected Not Detected    Influenza B PCR Not Detected Not Detected    SARS-CoV-2 PCR Not Detected Not Detected, Negative   APTT    Collection Time: 03/06/24  2:38 AM   Result Value Ref Range    PTT 30.9 23.2 - 33.7 seconds   Protime-INR    Collection Time: 03/06/24  2:38 AM   Result Value Ref Range    PT 14.7 (H) 12.5 - 14.5 seconds    INR 1.2 <=1.3   CBC with Differential    Collection Time: 03/06/24  3:20 AM   Result Value Ref Range    WBC 8.48 4.50 - 11.50 x10(3)/mcL    RBC 5.49 4.70 - 6.10 x10(6)/mcL    Hgb 15.0 14.0 - 18.0 g/dL    Hct 48.0 42.0 - 52.0 %    MCV 87.4 80.0 - 94.0 fL    MCH 27.3 27.0 - 31.0 pg    MCHC 31.3 (L) 33.0 - 36.0 g/dL    RDW 16.6 11.5 - 17.0 %    Platelet 184 130 - 400 x10(3)/mcL    MPV 10.0 7.4 - 10.4 fL    Neut % 91.9 %    Lymph % 5.9 %    Mono % 1.2 %    Eos % 0.4 %    Basophil % 0.4 %    Lymph # 0.50 (L) 0.6 - 4.6 x10(3)/mcL    Neut # 7.80 2.1 - 9.2 x10(3)/mcL    Mono # 0.10 0.1 - 1.3 x10(3)/mcL    Eos # 0.03 0 - 0.9 x10(3)/mcL    Baso # 0.03 <=0.2 x10(3)/mcL    IG# 0.02 0 - 0.04 x10(3)/mcL    IG% 0.2 %    NRBC% 0.0 %   Blood Culture #1 **CANNOT BE ORDERED STAT**    Collection Time: 03/06/24  3:20 AM     Specimen: Blood   Result Value Ref Range    CULTURE, BLOOD ([facility]) No Growth At 24 Hours    Lactic acid, plasma    Collection Time: 03/06/24  3:20 AM   Result Value Ref Range    Lactic Acid Level 1.0 0.5 - 2.2 mmol/L   Blood Culture #2 **CANNOT BE ORDERED STAT**    Collection Time: 03/06/24  4:02 AM    Specimen: Hand, Left; Blood   Result Value Ref Range    CULTURE, BLOOD ([facility]) No Growth At 24 Hours    Troponin I    Collection Time: 03/06/24  4:56 AM   Result Value Ref Range    Troponin-I <0.010 0.000 - 0.045 ng/mL   RT Blood Gas    Collection Time: 03/06/24  9:40 AM   Result Value Ref Range    Sample Type Arterial Blood     Sample site Right Radial Artery     Drawn by sd rrt     pH, Blood gas 7.440 7.350 - 7.450    pCO2, Blood gas 39.0 35.0 - 45.0 mmHg    pO2, Blood gas 66.0 (L) 80.0 - 100.0 mmHg    Sodium, Blood Gas 131 (L) 137 - 145 mmol/L    Potassium, Blood Gas 4.1 3.5 - 5.0 mmol/L    Calcium Level Ionized 1.23 1.12 - 1.23 mmol/L    TOC2, Blood gas 27.7 mmol/L    Base Excess, Blood gas 2.30 (H) -2.00 - 2.00 mmol/L    sO2, Blood gas 93.5 %    HCO3, Blood gas 26.5 (H) 22.0 - 26.0 mmol/L    THb, Blood gas 15.3 12 - 16 g/dL    O2 Hb, Blood Gas 93.3 (L) 94.0 - 97.0 %    CO Hgb 1.5 0.5 - 1.5 %    Met Hgb 0.5 0.4 - 1.5 %    Allens Test Yes     Oxygen Device, Blood gas Cannula     LPM 3    APTT    Collection Time: 03/06/24 10:44 AM   Result Value Ref Range    PTT 44.0 (H) 23.2 - 33.7 seconds   APTT    Collection Time: 03/06/24  6:26 PM   Result Value Ref Range    PTT 56.8 (H) 23.2 - 33.7 seconds   APTT    Collection Time: 03/07/24  4:31 AM   Result Value Ref Range    PTT 52.2 (H) 23.2 - 33.7 seconds   CBC with Differential    Collection Time: 03/07/24  4:31 AM   Result Value Ref Range    WBC 11.70 (H) 4.50 - 11.50 x10(3)/mcL    RBC 5.30 4.70 - 6.10 x10(6)/mcL    Hgb 14.5 14.0 - 18.0 g/dL    Hct 45.5 42.0 - 52.0 %    MCV 85.8 80.0 - 94.0 fL    MCH 27.4 27.0 - 31.0 pg    MCHC 31.9 (L) 33.0 - 36.0 g/dL    RDW 16.3 11.5 -  17.0 %    Platelet 218 130 - 400 x10(3)/mcL    MPV 9.5 7.4 - 10.4 fL    Neut % 60.0 %    Lymph % 28.5 %    Mono % 9.3 %    Eos % 1.4 %    Basophil % 0.4 %    Lymph # 3.34 0.6 - 4.6 x10(3)/mcL    Neut # 7.01 2.1 - 9.2 x10(3)/mcL    Mono # 1.09 0.1 - 1.3 x10(3)/mcL    Eos # 0.16 0 - 0.9 x10(3)/mcL    Baso # 0.05 <=0.2 x10(3)/mcL    IG# 0.05 (H) 0 - 0.04 x10(3)/mcL    IG% 0.4 %    NRBC% 0.0 %     Significant Imaging:  Imaging Results               CTA Chest Non-Coronary (PE Studies) (Final result)  Result time 03/06/24 10:49:32      Final result by Zoe Teresa MD (03/06/24 10:49:32)                   Impression:      1. There is a large defect in the right main pulmonary artery, which extends into the right lower lobe, lobar and proximal segmental divisions. This is consistent with pulmonary thromboembolism. No right ventricular strain is seen.    2. The lungs are slightly heterogeneous, which may reflect small airways disease versus mosaic attenuation. Ill-defined ground-glass opacities are seen in the posterior aspect of the right upper lobe, which may reflect pneumonia. Correlate with clinical and laboratory findings.    3. Trace right pleural effusion is seen.    4. Details and other findings as discussed above.    There is general concurrence with the preliminary report with slight discrepancies.  The pulmonary embolism  also extends into the right upper lobe pulmonary arteries.  The area of patchy infiltrate in the right upper lobe could represent developing pulmonary infarct.    This report was flagged in Epic as abnormal.      Electronically signed by: Tano Teresa  Date:    03/06/2024  Time:    10:49               Narrative:    EXAMINATION:  CTA CHEST NON CORONARY (PE STUDIES)    CLINICAL HISTORY:  Pulmonary embolism (PE) suspected, high prob;    TECHNIQUE:  Low dose axial images, sagittal and coronal reformations were obtained from the thoracic inlet to the lung bases following the IV  administration of contrast..  Contrast timing was optimized to evaluate the pulmonary arteries.  MIP images were performed.  Automated exposure control was utilized    COMPARISON:  None    FINDINGS:  Soft Tissues: Unremarkable    Neck: The thyroid gland appear unremarkable.    Mediastinum: The mediastinal structures are within normal limits.    Heart: The heart size is within normal limits.    Aorta: Unremarkable appearing aorta. No aortic dissection or aneurysm is seen.    Pulmonary Arteries: There is a large defect in the right main pulmonary artery, which extends into the right lower lobe, lobar and proximal segmental divisions. This is consistent with pulmonary thromboembolism. No right ventricular strain is seen. The remainder of the pulmonary arteries are patent    Lungs: The lungs are slightly heterogeneous, which may reflect small airways disease versus mosaic attenuation. Ill-defined ground-glass opacities are seen in the posterior aspect of the right upper lobe, which may reflect pneumonia. Streaky densities in the lingula of left upper lobe look like scar.    Pleura: Trace right pleural effusion is seen. No pneumothorax is seen.    Bony Structures:Spine: Mild spondylolytic changes are seen in the thoracic spine.    Abdomen: The visualized upper abdominal organs appear unremarkable.    Notifications: The results were discussed with the emergency room physician Dr. Donaldson prior to dictation at 2024-03-06 00:47:51 CST.                        Preliminary result by Emeterio Lopez Jr., MD (03/06/24 00:49:02)                   Impression:    1. There is a large defect in the right main pulmonary artery, which extends into the right lower lobe, lobar and proximal segmental divisions. This is consistent with pulmonary thromboembolism. No right ventricular strain is seen.  2. The lungs are slightly heterogeneous, which may reflect small airways disease versus mosaic attenuation. Ill-defined ground-glass  opacities are seen in the posterior aspect of the right upper lobe, which may reflect pneumonia. Correlate with clinical and laboratory findings.  3. Trace right pleural effusion is seen.  4. Details and other findings as discussed above.               Narrative:    START OF REPORT:  Technique: CT Scan of the chest was performed with intravenous contrast with direct axial images as well as sagittal and coronal reconstruction images pulmonary embolus protocol.    Dosage Information: Automated Exposure Control was utilized.    Comparison: None.    Clinical History: Sob, r/o pe.    Findings:  Soft Tissues: Unremarkable.  Neck: The thyroid gland appear unremarkable.  Mediastinum: The mediastinal structures are within normal limits.  Heart: The heart size is within normal limits.  Aorta: Unremarkable appearing aorta. No aortic dissection or aneurysm is seen.  Pulmonary Arteries: There is a large defect in the right main pulmonary artery, which extends into the right lower lobe, lobar and proximal segmental divisions. This is consistent with pulmonary thromboembolism. No right ventricular strain is seen. The remainder of the pulmonary arteries are patent.  Lungs: The lungs are slightly heterogeneous, which may reflect small airways disease versus mosaic attenuation. Ill-defined ground-glass opacities are seen in the posterior aspect of the right upper lobe, which may reflect pneumonia. Streaky densities in the lingula of left upper lobe look like scar.  Pleura: Trace right pleural effusion is seen. No pneumothorax is seen.  Bony Structures:  Spine: Mild spondylolytic changes are seen in the thoracic spine.  Abdomen: The visualized upper abdominal organs appear unremarkable.    Notifications: The results were discussed with the emergency room physician Dr. Donaldson prior to dictation at 2024-03-06 00:47:51 CST.                                      EKG:     Telemetry:  SR    Physical Exam  HENT:      Head: Normocephalic.       Nose: Nose normal.      Mouth/Throat:      Mouth: Mucous membranes are moist.   Eyes:      Extraocular Movements: Extraocular movements intact.   Cardiovascular:      Rate and Rhythm: Normal rate and regular rhythm.      Pulses: Normal pulses.      Heart sounds: Normal heart sounds.   Pulmonary:      Effort: Pulmonary effort is normal.      Breath sounds: Normal breath sounds.      Comments: 4 L NC  Abdominal:      Palpations: Abdomen is soft.   Skin:     General: Skin is warm and dry.      Findings: Rash present.   Neurological:      Mental Status: He is alert and oriented to person, place, and time.   Psychiatric:         Behavior: Behavior normal.         Home Medications:   No current facility-administered medications on file prior to encounter.     Current Outpatient Medications on File Prior to Encounter   Medication Sig Dispense Refill    amLODIPine-benazepriL (LOTREL) 10-40 mg per capsule Take 1 capsule by mouth once daily.      ciprofloxacin HCl (CIPRO) 500 MG tablet Take 1 tablet (500 mg total) by mouth 2 (two) times daily. for 10 days 20 tablet 0    citalopram (CELEXA) 10 MG tablet Take 10 mg by mouth once daily.      pantoprazole (PROTONIX) 40 MG tablet Take 40 mg by mouth once daily.      [DISCONTINUED] diclofenac (VOLTAREN) 50 MG EC tablet Take 1 tablet (50 mg total) by mouth 3 (three) times daily. for 5 days 15 tablet 0     Current Inpatient Medications:    Current Facility-Administered Medications:     amLODIPine tablet 10 mg, 10 mg, Oral, Daily, Cody Moncada MD, 10 mg at 03/07/24 0830    apixaban tablet 10 mg, 10 mg, Oral, BID, Kristel Mcgee FNP    [START ON 3/14/2024] apixaban tablet 5 mg, 5 mg, Oral, BID, Kristel Mcgee FNP    citalopram tablet 10 mg, 10 mg, Oral, Daily, Cody Moncada MD, 10 mg at 03/07/24 0830    lisinopriL tablet 40 mg, 40 mg, Oral, Daily, Cody Moncada MD, 40 mg at 03/07/24 0830    pantoprazole EC tablet 40 mg, 40 mg, Oral,  Daily, Cody Moncada MD, 40 mg at 03/07/24 0830  VTE Risk Mitigation (From admission, onward)           Ordered     apixaban tablet 5 mg  2 times daily         03/07/24 0948     apixaban tablet 10 mg  2 times daily         03/07/24 0948                  Assessment:   Acute PE    - CTA Chest (3.6.24): large defect in the right main pulmonary artery, which extends into the right lower lobe, lobar and proximal segmental divisions.  Hx of PE (2021)    - Not on OAC currently   Klinefelter's Syndrome   Angioedema/Allergic Reaction - Resolved     - s/t Bactrim   HTN  Anxiety     Plan:   Transition to Eliquis PE dosage today -> Eliquis 10 mg BID x 7 days, then Eliquis 5 mg BID indefinitely. He will need lifelong treatment.   Suspect PE could have developed s/t hx of Klinefelter's Syndrome & testosterone usage.   May need to consider hypercoagulability workup in the outpatient setting.     No further recommendations from cardiac standpoint. We will sign  off. Please don't hesitate to reach out with questions or concerns.     Kristel Mcgee, TOMMIE  Cardiology  [facility] - 6th Floor Medical Telemetry  03/07/2024

## 2024-03-07 NOTE — H&P
[facility] - 6th Floor [facility] Medicine  History & Physical    Patient Name: Oraico Gold  MRN: [id number]  Patient Class: IP- Inpatient  Admission Date: 3/5/2024  Attending Physician: Cody Moncada*   Primary Care Provider: Jessica Loyd NP         Patient information was obtained from patient and ER records.     Subjective:  48 yold male with multiple medical problems including Klinefelter Syndrome and past hx of PE. Comes to ED with a swollen lip. Apparently went to a [facility] with Urinary problems and was prescribed Bactrim PO for it. He stated the treatment and felt his lip was getting swollen as the time pass. After he notice he was getting worst by the minute he decided to come to ED. ED MD evaluated and found his lower lip swollen but no SOB or stridor noted.He also had a rash that resolved earlier.  He underwent a CT of the chest that showed a large defect on his right main pulmonary artery. He was placed on IV heparin protocol and he was admitted to my service. Cardio and Pulmonary were consulted.     Principal Problem:<principal problem not specified>    Chief Complaint:   Chief Complaint   Patient presents with    Angioedema     Pt started bactrim for UTI on Sunday, rash started on Monday , lower lip and jaw swelling started at 5pm. Pt denies sob.         HPI: No notes on file    There is no problem list on file for this patient.       No past medical history on file.     No past surgical history on file.     No current facility-administered medications on file prior to encounter.     Current Outpatient Medications on File Prior to Encounter   Medication Sig Dispense Refill    amLODIPine-benazepriL (LOTREL) 10-40 mg per capsule Take 1 capsule by mouth once daily.      ciprofloxacin HCl (CIPRO) 500 MG tablet Take 1 tablet (500 mg total) by mouth 2 (two) times daily. for 10 days 20 tablet 0    citalopram (CELEXA) 10 MG tablet Take 10 mg by mouth once  "daily.      [] diclofenac (VOLTAREN) 50 MG EC tablet Take 1 tablet (50 mg total) by mouth 3 (three) times daily. for 5 days 15 tablet 0    pantoprazole (PROTONIX) 40 MG tablet Take 40 mg by mouth once daily.      testosterone cypionate (DEPOTESTOTERONE CYPIONATE) 200 mg/mL injection Inject 100 mg into the muscle every 7 days.      [DISCONTINUED] sulfamethoxazole-trimethoprim 200-40 mg/5 ml (BACTRIM,SEPTRA) 200-40 mg/5 mL Susp Take 8 mg/kg/day by mouth every 12 (twelve) hours.          Review of patient's allergies indicates:   Allergen Reactions    Bactrim [sulfamethoxazole-trimethoprim] Anaphylaxis     ANGIOEDEMA        Social History     Socioeconomic History    Marital status:         No family history on file.     Review of Systems   Respiratory:  Positive for shortness of breath.    Skin:         Swollen lower lip   All other systems reviewed and are negative.         Current Facility-Administered Medications:     heparin 25,000 units in dextrose 5% (100 units/ml) IV bolus from bag HIGH INTENSITY nomogram - [facility], 60 Units/kg (Adjusted), Intravenous, PRN, Jessica Moody MD, 4,938 Units at 24 1139    heparin 25,000 units in dextrose 5% (100 units/ml) IV bolus from bag HIGH INTENSITY nomogram - [facility], 30 Units/kg (Adjusted), Intravenous, PRN, Jessica Moody MD    heparin 25,000 units in dextrose 5% 250 mL (100 units/mL) infusion HIGH INTENSITY nomogram - [facility], 0-40 Units/kg/hr (Adjusted), Intravenous, Continuous, Jessica Moody MD, Last Rate: 17.3 mL/hr at 24 1206, 21 Units/kg/hr at 24 1206     Objective      Vitals:    24 0840 24 0947 24 1127 24 1451   BP: 126/81  (!) 143/78 (!) 149/94   Pulse: 68  80 72   Resp: 18      Temp: 98.2 °F (36.8 °C)  97.7 °F (36.5 °C) 98 °F (36.7 °C)   TempSrc: Oral  Oral Oral   SpO2: (!) 89% 96% 96% (!) 94%   Weight:   99.8 kg (220 lb)    Height:   5' 9" (1.753 m)           Intake/Output Summary (Last 24 hours) at 3/6/2024 " 1809  Last data filed at 3/6/2024 1400  Gross per 24 hour   Intake 560 ml   Output 1400 ml   Net -840 ml           Recent Labs   Lab 03/05/24  2228      K 3.9   CO2 25   BUN 5.3*   CREATININE 0.82   CALCIUM 9.2   BILITOT 1.1   ALKPHOS 79   ALT 17   AST 21   GLUCOSE 122*     Recent Labs   Lab 03/06/24  0320   WBC 8.48   HGB 15.0   HCT 48.0       Urine dipstick shows not done.  Micro exam: not done.     Imaging Results               CTA Chest Non-Coronary (PE Studies) (Final result)  Result time 03/06/24 10:49:32      Final result by Zoe Teresa MD (03/06/24 10:49:32)                   Impression:      1. There is a large defect in the right main pulmonary artery, which extends into the right lower lobe, lobar and proximal segmental divisions. This is consistent with pulmonary thromboembolism. No right ventricular strain is seen.    2. The lungs are slightly heterogeneous, which may reflect small airways disease versus mosaic attenuation. Ill-defined ground-glass opacities are seen in the posterior aspect of the right upper lobe, which may reflect pneumonia. Correlate with clinical and laboratory findings.    3. Trace right pleural effusion is seen.    4. Details and other findings as discussed above.    There is general concurrence with the preliminary report with slight discrepancies.  The pulmonary embolism  also extends into the right upper lobe pulmonary arteries.  The area of patchy infiltrate in the right upper lobe could represent developing pulmonary infarct.    This report was flagged in Epic as abnormal.      Electronically signed by: Tano Teresa  Date:    03/06/2024  Time:    10:49               Narrative:    EXAMINATION:  CTA CHEST NON CORONARY (PE STUDIES)    CLINICAL HISTORY:  Pulmonary embolism (PE) suspected, high prob;    TECHNIQUE:  Low dose axial images, sagittal and coronal reformations were obtained from the thoracic inlet to the lung bases following the IV  administration of contrast..  Contrast timing was optimized to evaluate the pulmonary arteries.  MIP images were performed.  Automated exposure control was utilized    COMPARISON:  None    FINDINGS:  Soft Tissues: Unremarkable    Neck: The thyroid gland appear unremarkable.    Mediastinum: The mediastinal structures are within normal limits.    Heart: The heart size is within normal limits.    Aorta: Unremarkable appearing aorta. No aortic dissection or aneurysm is seen.    Pulmonary Arteries: There is a large defect in the right main pulmonary artery, which extends into the right lower lobe, lobar and proximal segmental divisions. This is consistent with pulmonary thromboembolism. No right ventricular strain is seen. The remainder of the pulmonary arteries are patent    Lungs: The lungs are slightly heterogeneous, which may reflect small airways disease versus mosaic attenuation. Ill-defined ground-glass opacities are seen in the posterior aspect of the right upper lobe, which may reflect pneumonia. Streaky densities in the lingula of left upper lobe look like scar.    Pleura: Trace right pleural effusion is seen. No pneumothorax is seen.    Bony Structures:Spine: Mild spondylolytic changes are seen in the thoracic spine.    Abdomen: The visualized upper abdominal organs appear unremarkable.    Notifications: The results were discussed with the emergency room physician Dr. Donaldson prior to dictation at 2024-03-06 00:47:51 CST.                        Preliminary result by Emeterio Lopez Jr., MD (03/06/24 00:49:02)                   Impression:    1. There is a large defect in the right main pulmonary artery, which extends into the right lower lobe, lobar and proximal segmental divisions. This is consistent with pulmonary thromboembolism. No right ventricular strain is seen.  2. The lungs are slightly heterogeneous, which may reflect small airways disease versus mosaic attenuation. Ill-defined ground-glass  opacities are seen in the posterior aspect of the right upper lobe, which may reflect pneumonia. Correlate with clinical and laboratory findings.  3. Trace right pleural effusion is seen.  4. Details and other findings as discussed above.               Narrative:    START OF REPORT:  Technique: CT Scan of the chest was performed with intravenous contrast with direct axial images as well as sagittal and coronal reconstruction images pulmonary embolus protocol.    Dosage Information: Automated Exposure Control was utilized.    Comparison: None.    Clinical History: Sob, r/o pe.    Findings:  Soft Tissues: Unremarkable.  Neck: The thyroid gland appear unremarkable.  Mediastinum: The mediastinal structures are within normal limits.  Heart: The heart size is within normal limits.  Aorta: Unremarkable appearing aorta. No aortic dissection or aneurysm is seen.  Pulmonary Arteries: There is a large defect in the right main pulmonary artery, which extends into the right lower lobe, lobar and proximal segmental divisions. This is consistent with pulmonary thromboembolism. No right ventricular strain is seen. The remainder of the pulmonary arteries are patent.  Lungs: The lungs are slightly heterogeneous, which may reflect small airways disease versus mosaic attenuation. Ill-defined ground-glass opacities are seen in the posterior aspect of the right upper lobe, which may reflect pneumonia. Streaky densities in the lingula of left upper lobe look like scar.  Pleura: Trace right pleural effusion is seen. No pneumothorax is seen.  Bony Structures:  Spine: Mild spondylolytic changes are seen in the thoracic spine.  Abdomen: The visualized upper abdominal organs appear unremarkable.    Notifications: The results were discussed with the emergency room physician Dr. Donaldson prior to dictation at 2024-03-06 00:47:51 CST.                                         Physical Exam  Vitals and nursing note reviewed. Exam conducted with a  chaperone present.   Constitutional:       Appearance: Normal appearance.   HENT:      Head: Normocephalic and atraumatic.      Right Ear: Tympanic membrane, ear canal and external ear normal.      Left Ear: Tympanic membrane, ear canal and external ear normal.      Nose: Nose normal.      Mouth/Throat:      Mouth: Mucous membranes are moist.   Eyes:      Extraocular Movements: Extraocular movements intact.      Pupils: Pupils are equal, round, and reactive to light.   Cardiovascular:      Rate and Rhythm: Normal rate and regular rhythm.   Pulmonary:      Effort: Pulmonary effort is normal.      Breath sounds: Normal breath sounds.      Comments: O2 NC 4 lt tolerated well  Abdominal:      General: Abdomen is flat.      Palpations: Abdomen is soft.   Musculoskeletal:         General: Normal range of motion.      Cervical back: Normal range of motion and neck supple.   Skin:     General: Skin is warm and dry.   Neurological:      General: No focal deficit present.      Mental Status: He is alert and oriented to person, place, and time.   Psychiatric:         Mood and Affect: Mood normal.         Behavior: Behavior normal.          Assessment/Plan:  1-PE 2nd started IV heparin, will transition to PO Eliquis 10 mg BID per cardio and pulmonary recommendations  2-(-) US for DVT's  3-Klinefelter Syndrome by Hx  4-Previous PE  5-Hormonal replacement treatment  Will follow closely with consultants, D/C planning after transition to PO Eliquis     No notes have been filed under this hospital service.  Service: Hospital Medicine    VTE Risk Mitigation (From admission, onward)           Ordered     heparin 25,000 units in dextrose 5% (100 units/ml) IV bolus from bag HIGH INTENSITY nomogram - [facility]  As needed (PRN)        Question:  Heparin Infusion Adjustment (DO NOT MODIFY ANSWER)  Answer:  [email or web address]\Images\Pharmacy\HeparinInfusions\heparin HIGH INTENSITY nomogram for G [id number].pdf    03/06/24 0215     heparin 25,000  units in dextrose 5% (100 units/ml) IV bolus from bag HIGH INTENSITY nomogram - [facility]  As needed (PRN)        Question:  Heparin Infusion Adjustment (DO NOT MODIFY ANSWER)  Answer:  [email or web address]\epic\Images\Pharmacy\HeparinInfusions\heparin HIGH INTENSITY nomogram for [facility] [id number].pdf    03/06/24 0215     heparin 25,000 units in dextrose 5% 250 mL (100 units/mL) infusion HIGH INTENSITY nomogram - [facility]  Continuous        Question:  Begin at (units/kg/hr)  Answer:  18 03/06/24 0215                       Cody Moncada MD  Department of Hospital Medicine   [facility] - 6th Floor Medical Telemetry

## 2024-03-07 NOTE — PROGRESS NOTES
[facility] - 6th Floor  Pulmonology progress note    Reason for Consultation:  Pulmonary Embolism    HPI:  Patient is a 48 year old male with past medical history of PE, hypertension, anxiety who presented to [facility] on 3/6 for swelling of his upper lip. He states he started to have welts after taking 3 doses of bactrim a few days ago. This skin rash continued to worsen and the lip swelling began last night prompting patient to come to the ED. CTA at that time showed PE in the right pulmonary artery. Patient saturating well on room air, no other respiratory symptoms and no signs of heart strain on echocardiogram. Blood pressure and pulse remain in good range. Patient states he had PE in 2021 which was attributed to COVID vaccine, only remained on anti-coagulation for about 6 weeks and has not taken any since. No provoking factors this time, no recent travel, no recent surgery, no history of malignancy, no family history of clot. Patient states his swelling and allergic reaction is better and he has no other complaints at this time. Pulmonology consulted for PE    Interval history:  No acute events overnight.  Patient had oxygen off yesterday but it was placed back on overnight as he desaturates when sleeps.  Patient states he has sleep apnea but does not use CPAP.  Otherwise breathing remained stable, blood pressure and pulse normal this morning.  Patient with no further episodes of swelling.  No other acute concerns at this time    No past medical history on file.    No past surgical history on file.    Social History     Socioeconomic History    Marital status:      No family history on file.    Drug Allergies:   Review of patient's allergies indicates:   Allergen Reactions    Bactrim [sulfamethoxazole-trimethoprim] Anaphylaxis     ANGIOEDEMA   Current Infusions:   heparin (porcine) in D5W 25 Units/kg/hr (03/07/24 8605)   Scheduled Medications:     amLODIPine  10 mg Oral Daily    citalopram  10 mg  "Oral Daily    lisinopriL  40 mg Oral Daily    pantoprazole  40 mg Oral Daily     PRN Medications:   heparin (PORCINE), heparin (PORCINE)    Review of Systems:   Review of Systems   Constitutional:  Negative for fever and malaise/fatigue.   Respiratory:  Negative for cough and shortness of breath.    Cardiovascular:  Negative for chest pain and leg swelling.   Gastrointestinal:  Negative for abdominal pain.   Genitourinary:  Negative for dysuria.   Neurological:  Negative for weakness and headaches.   Psychiatric/Behavioral:  The patient is not nervous/anxious.      Vital Signs:    Vitals:    03/07/24 0825   BP: (!) 152/83   Pulse: 75   Resp: 16   Temp: 98.1 °F (36.7 °C)   Fluid Balance:     Intake/Output Summary (Last 24 hours) at 3/7/2024 0847  Last data filed at 3/6/2024 2152  Gross per 24 hour   Intake 800 ml   Output 600 ml   Net 200 ml       Physical Exam  Constitutional:       General: He is not in acute distress.     Appearance: Normal appearance.   HENT:      Head: Normocephalic and atraumatic.   Cardiovascular:      Rate and Rhythm: Normal rate and regular rhythm.      Pulses: Normal pulses.      Heart sounds: No murmur heard.     No friction rub.   Pulmonary:      Effort: No respiratory distress.      Breath sounds: Normal breath sounds. No wheezing.   Abdominal:      Palpations: Abdomen is soft.      Tenderness: There is no abdominal tenderness.   Musculoskeletal:      Right lower leg: No edema.      Left lower leg: No edema.   Neurological:      General: No focal deficit present.      Mental Status: He is alert and oriented to person, place, and time.       Laboratory Studies:     Recent Labs   Lab 03/07/24  0431   WBC 11.70*   RBC 5.30   HGB 14.5   HCT 45.5      MCV 85.8   MCH 27.4   MCHC 31.9*       No results for input(s): "GLUCOSE", "NA", "K", "CL", "CO2", "BUN", "CREATININE", "CALCIUM", "MG" in the last 24 hours.  Microbiology Data:   Microbiology Results (last 7 days)       Procedure " Component Value Units Date/Time    Blood Culture #2 **CANNOT BE ORDERED STAT** [[id number]]  (Normal) Collected: 03/06/24 0402    Order Status: Completed Specimen: Blood from Hand, Left Updated: 03/07/24 0500     CULTURE, BLOOD ([facility]) No Growth At 24 Hours    Blood Culture #1 **CANNOT BE ORDERED STAT** [[id number]]  (Normal) Collected: 03/06/24 0320    Order Status: Completed Specimen: Blood Updated: 03/07/24 0404     CULTURE, BLOOD ([facility]) No Growth At 24 Hours        Imaging reviewed:  CV Ultrasound doppler venous arm left  There was no evidence of deep or superficial vein thrombosis in the left   upper extremity.  CV Ultrasound doppler venous legs bilat  There was no evidence of deep or superficial vein thrombosis in the   bilateral lower extremities.  CTA Chest Non-Coronary (PE Studies)  Narrative: EXAMINATION:  CTA CHEST NON CORONARY (PE STUDIES)    CLINICAL HISTORY:  Pulmonary embolism (PE) suspected, high prob;    TECHNIQUE:  Low dose axial images, sagittal and coronal reformations were obtained from the thoracic inlet to the lung bases following the IV administration of contrast..  Contrast timing was optimized to evaluate the pulmonary arteries.  MIP images were performed.  Automated exposure control was utilized    COMPARISON:  None    FINDINGS:  Soft Tissues: Unremarkable    Neck: The thyroid gland appear unremarkable.    Mediastinum: The mediastinal structures are within normal limits.    Heart: The heart size is within normal limits.    Aorta: Unremarkable appearing aorta. No aortic dissection or aneurysm is seen.    Pulmonary Arteries: There is a large defect in the right main pulmonary artery, which extends into the right lower lobe, lobar and proximal segmental divisions. This is consistent with pulmonary thromboembolism. No right ventricular strain is seen. The remainder of the pulmonary arteries are patent    Lungs: The lungs are slightly heterogeneous, which may reflect small airways disease versus  mosaic attenuation. Ill-defined ground-glass opacities are seen in the posterior aspect of the right upper lobe, which may reflect pneumonia. Streaky densities in the lingula of left upper lobe look like scar.    Pleura: Trace right pleural effusion is seen. No pneumothorax is seen.    Bony Structures:Spine: Mild spondylolytic changes are seen in the thoracic spine.    Abdomen: The visualized upper abdominal organs appear unremarkable.    Notifications: The results were discussed with the emergency room physician Dr. Donaldson prior to dictation at 2024-03-06 00:47:51 CST.  Impression: 1. There is a large defect in the right main pulmonary artery, which extends into the right lower lobe, lobar and proximal segmental divisions. This is consistent with pulmonary thromboembolism. No right ventricular strain is seen.    2. The lungs are slightly heterogeneous, which may reflect small airways disease versus mosaic attenuation. Ill-defined ground-glass opacities are seen in the posterior aspect of the right upper lobe, which may reflect pneumonia. Correlate with clinical and laboratory findings.    3. Trace right pleural effusion is seen.    4. Details and other findings as discussed above.    There is general concurrence with the preliminary report with slight discrepancies.  The pulmonary embolism  also extends into the right upper lobe pulmonary arteries.  The area of patchy infiltrate in the right upper lobe could represent developing pulmonary infarct.    This report was flagged in Epic as abnormal.    Electronically signed by: Tano Teresa  Date:    03/06/2024  Time:    10:49  Echo    Left Ventricle: The left ventricle is normal in size. Normal wall   thickness. There is normal systolic function with a visually estimated   ejection fraction of 55 - 70%. Grade I diastolic dysfunction.    Right Ventricle: Normal right ventricular cavity size. Systolic   function is normal.    Pulmonary Artery: No pulmonary  hypertension. The estimated pulmonary   artery systolic pressure is 22 mmHg.    IVC/SVC: Intermediate venous pressure at 8 mmHg.    Assessment and Plan:    Assessment:  Large pulmonary embolus, right pulmonary artery. Unprovoked  History of prior PE in 2021, not on anticoagulation  Obstructive sleep apnea  Angioedema secondary to Bactrim- improving   Klinefelter syndrome  Hypertension  Anxiety    Plan:  - switch heparin drip to DOAC today, will need to continue this indefinitely  -should wear CPAP overnight while inpatient  - okay to discharge from pulmonary standpoint    Sascha Bethea DO  3/7/2024  Pulmonology/Critical Care PGY3

## 2024-03-08 ENCOUNTER — PATIENT OUTREACH (OUTPATIENT)
Dept: ADMINISTRATIVE | Facility: CLINIC | Age: 49
End: 2024-03-08
Payer: COMMERCIAL

## 2024-03-08 LAB
OHS QRS DURATION: 80 MS
OHS QTC CALCULATION: 401 MS

## 2024-03-08 NOTE — PROGRESS NOTES
C3 nurse spoke with Oracio Gold  for a TCC post hospital discharge follow up call. The patient states he had [facility] Loyd [facility] today on 3/8/24.

## 2024-03-11 ENCOUNTER — HOSPITAL ENCOUNTER (INPATIENT)
Facility: HOSPITAL | Age: 49
LOS: 2 days | Discharge: HOME | DRG: 915 | End: 2024-03-13
Attending: EMERGENCY MEDICINE | Admitting: INTERNAL MEDICINE
Payer: COMMERCIAL

## 2024-03-11 DIAGNOSIS — T78.3XXA ANGIOEDEMA: ICD-10-CM

## 2024-03-11 DIAGNOSIS — T46.4X5A ANGIOEDEMA DUE TO ANGIOTENSIN CONVERTING ENZYME INHIBITOR (ACE-I): Primary | ICD-10-CM

## 2024-03-11 DIAGNOSIS — I26.99 RIGHT PULMONARY EMBOLUS: ICD-10-CM

## 2024-03-11 DIAGNOSIS — Z79.01 ANTICOAGULATED BY ANTICOAGULATION TREATMENT: ICD-10-CM

## 2024-03-11 DIAGNOSIS — T78.3XXA ANGIOEDEMA DUE TO ANGIOTENSIN CONVERTING ENZYME INHIBITOR (ACE-I): Primary | ICD-10-CM

## 2024-03-11 LAB
ALBUMIN SERPL-MCNC: 3.8 G/DL (ref 3.5–5)
ALBUMIN/GLOB SERPL: 1.1 RATIO (ref 1.1–2)
ALLENS TEST BLOOD GAS (OHS): YES
ALP SERPL-CCNC: 90 UNIT/L (ref 40–150)
ALT SERPL-CCNC: 24 UNIT/L (ref 0–55)
APTT PPP: 28.3 SECONDS (ref 23.2–33.7)
AST SERPL-CCNC: 18 UNIT/L (ref 5–34)
BACTERIA BLD CULT: NORMAL
BACTERIA BLD CULT: NORMAL
BASE EXCESS BLD CALC-SCNC: 1.4 MMOL/L (ref -2–2)
BASOPHILS # BLD AUTO: 0.06 X10(3)/MCL
BASOPHILS NFR BLD AUTO: 0.6 %
BILIRUB SERPL-MCNC: 0.7 MG/DL
BLOOD GAS SAMPLE TYPE (OHS): ABNORMAL
BUN SERPL-MCNC: 10.6 MG/DL (ref 8.9–20.6)
CA-I BLD-SCNC: 1.17 MMOL/L (ref 1.12–1.23)
CALCIUM SERPL-MCNC: 9.4 MG/DL (ref 8.4–10.2)
CHLORIDE SERPL-SCNC: 100 MMOL/L (ref 98–107)
CO2 BLDA-SCNC: 28.6 MMOL/L
CO2 SERPL-SCNC: 25 MMOL/L (ref 22–29)
COHGB MFR BLDA: 1.9 % (ref 0.5–1.5)
CREAT SERPL-MCNC: 0.79 MG/DL (ref 0.73–1.18)
DRAWN BY BLOOD GAS (OHS): ABNORMAL
EOSINOPHIL # BLD AUTO: 0.27 X10(3)/MCL (ref 0–0.9)
EOSINOPHIL NFR BLD AUTO: 2.7 %
ERYTHROCYTE [DISTWIDTH] IN BLOOD BY AUTOMATED COUNT: 17.2 % (ref 11.5–17)
GFR SERPLBLD CREATININE-BSD FMLA CKD-EPI: >60 MLS/MIN/1.73/M2
GLOBULIN SER-MCNC: 3.4 GM/DL (ref 2.4–3.5)
GLUCOSE SERPL-MCNC: 113 MG/DL (ref 74–100)
GROUP & RH: NORMAL
HCO3 BLDA-SCNC: 27.2 MMOL/L (ref 22–26)
HCT VFR BLD AUTO: 48.9 % (ref 42–52)
HGB BLD-MCNC: 15.6 G/DL (ref 14–18)
IMM GRANULOCYTES # BLD AUTO: 0.04 X10(3)/MCL (ref 0–0.04)
IMM GRANULOCYTES NFR BLD AUTO: 0.4 %
INDIRECT COOMBS: NORMAL
INHALED O2 CONCENTRATION: 100 %
INR PPP: 1.2
ITIME (SEC) (OHS): 1 SEC
LYMPHOCYTES # BLD AUTO: 3.17 X10(3)/MCL (ref 0.6–4.6)
LYMPHOCYTES NFR BLD AUTO: 32.3 %
MCH RBC QN AUTO: 26.9 PG (ref 27–31)
MCHC RBC AUTO-ENTMCNC: 31.9 G/DL (ref 33–36)
MCV RBC AUTO: 84.5 FL (ref 80–94)
MECH RR (OHS): 20 B/MIN
METHGB MFR BLDA: 0.6 % (ref 0.4–1.5)
MODE (OHS): ABNORMAL
MONOCYTES # BLD AUTO: 1.31 X10(3)/MCL (ref 0.1–1.3)
MONOCYTES NFR BLD AUTO: 13.3 %
NEUTROPHILS # BLD AUTO: 4.97 X10(3)/MCL (ref 2.1–9.2)
NEUTROPHILS NFR BLD AUTO: 50.7 %
NRBC BLD AUTO-RTO: 0 %
O2 HB BLOOD GAS (OHS): 95.4 % (ref 94–97)
OXYGEN DEVICE BLOOD GAS (OHS): ABNORMAL
OXYHGB MFR BLDA: 14.8 G/DL (ref 12–16)
PCO2 BLDA: 46 MMHG (ref 35–45)
PEEP RESPIRATORY: 5 CMH2O
PH BLDA: 7.38 [PH] (ref 7.35–7.45)
PLATELET # BLD AUTO: 232 X10(3)/MCL (ref 130–400)
PMV BLD AUTO: 10.9 FL (ref 7.4–10.4)
PO2 BLDA: 96 MMHG (ref 80–100)
POTASSIUM BLOOD GAS (OHS): 4.2 MMOL/L (ref 3.5–5)
POTASSIUM SERPL-SCNC: 4.4 MMOL/L (ref 3.5–5.1)
PROT SERPL-MCNC: 7.2 GM/DL (ref 6.4–8.3)
PROTHROMBIN TIME: 14.6 SECONDS (ref 12.5–14.5)
PS (OHS): 10 CMH2O
RBC # BLD AUTO: 5.79 X10(6)/MCL (ref 4.7–6.1)
SAMPLE SITE BLOOD GAS (OHS): ABNORMAL
SAO2 % BLDA: 97.3 %
SODIUM BLOOD GAS (OHS): 130 MMOL/L (ref 137–145)
SODIUM SERPL-SCNC: 139 MMOL/L (ref 136–145)
SPECIMEN OUTDATE: NORMAL
SPONT+MECH VT ON VENT: 500 ML
WBC # SPEC AUTO: 9.82 X10(3)/MCL (ref 4.5–11.5)

## 2024-03-11 PROCEDURE — 25000003 PHARM REV CODE 250

## 2024-03-11 PROCEDURE — 27200966 HC CLOSED SUCTION SYSTEM

## 2024-03-11 PROCEDURE — 99900026 HC AIRWAY MAINTENANCE (STAT)

## 2024-03-11 PROCEDURE — 96374 THER/PROPH/DIAG INJ IV PUSH: CPT

## 2024-03-11 PROCEDURE — 94760 N-INVAS EAR/PLS OXIMETRY 1: CPT

## 2024-03-11 PROCEDURE — 85730 THROMBOPLASTIN TIME PARTIAL: CPT | Performed by: EMERGENCY MEDICINE

## 2024-03-11 PROCEDURE — 25000003 PHARM REV CODE 250: Performed by: PHYSICIAN ASSISTANT

## 2024-03-11 PROCEDURE — 85610 PROTHROMBIN TIME: CPT | Performed by: EMERGENCY MEDICINE

## 2024-03-11 PROCEDURE — 80053 COMPREHEN METABOLIC PANEL: CPT | Performed by: EMERGENCY MEDICINE

## 2024-03-11 PROCEDURE — 20000000 HC ICU ROOM

## 2024-03-11 PROCEDURE — 82803 BLOOD GASES ANY COMBINATION: CPT

## 2024-03-11 PROCEDURE — 25000003 PHARM REV CODE 250: Performed by: INTERNAL MEDICINE

## 2024-03-11 PROCEDURE — 36600 WITHDRAWAL OF ARTERIAL BLOOD: CPT

## 2024-03-11 PROCEDURE — 85025 COMPLETE CBC W/AUTO DIFF WBC: CPT | Performed by: EMERGENCY MEDICINE

## 2024-03-11 PROCEDURE — 63600175 PHARM REV CODE 636 W HCPCS: Performed by: EMERGENCY MEDICINE

## 2024-03-11 PROCEDURE — 5A0935A ASSISTANCE WITH RESPIRATORY VENTILATION, LESS THAN 24 CONSECUTIVE HOURS, HIGH NASAL FLOW/VELOCITY: ICD-10-PCS | Performed by: EMERGENCY MEDICINE

## 2024-03-11 PROCEDURE — 25000003 PHARM REV CODE 250: Performed by: EMERGENCY MEDICINE

## 2024-03-11 PROCEDURE — 63600175 PHARM REV CODE 636 W HCPCS

## 2024-03-11 PROCEDURE — 99900035 HC TECH TIME PER 15 MIN (STAT)

## 2024-03-11 PROCEDURE — 99285 EMERGENCY DEPT VISIT HI MDM: CPT | Mod: 25

## 2024-03-11 PROCEDURE — 99900031 HC PATIENT EDUCATION (STAT)

## 2024-03-11 PROCEDURE — 63600175 PHARM REV CODE 636 W HCPCS: Performed by: PHYSICIAN ASSISTANT

## 2024-03-11 PROCEDURE — 5A1935Z RESPIRATORY VENTILATION, LESS THAN 24 CONSECUTIVE HOURS: ICD-10-PCS | Performed by: EMERGENCY MEDICINE

## 2024-03-11 PROCEDURE — 96375 TX/PRO/DX INJ NEW DRUG ADDON: CPT

## 2024-03-11 PROCEDURE — 94002 VENT MGMT INPAT INIT DAY: CPT

## 2024-03-11 PROCEDURE — 86901 BLOOD TYPING SEROLOGIC RH(D): CPT | Performed by: EMERGENCY MEDICINE

## 2024-03-11 PROCEDURE — 27100171 HC OXYGEN HIGH FLOW UP TO 24 HOURS

## 2024-03-11 PROCEDURE — 0BH17EZ INSERTION OF ENDOTRACHEAL AIRWAY INTO TRACHEA, VIA NATURAL OR ARTIFICIAL OPENING: ICD-10-PCS | Performed by: EMERGENCY MEDICINE

## 2024-03-11 RX ORDER — FAMOTIDINE 20 MG/1
20 TABLET, FILM COATED ORAL 2 TIMES DAILY
Status: DISCONTINUED | OUTPATIENT
Start: 2024-03-11 | End: 2024-03-13 | Stop reason: HOSPADM

## 2024-03-11 RX ORDER — FAMOTIDINE 10 MG/ML
40 INJECTION INTRAVENOUS
Status: COMPLETED | OUTPATIENT
Start: 2024-03-11 | End: 2024-03-11

## 2024-03-11 RX ORDER — METHYLPREDNISOLONE SOD SUCC 125 MG
125 VIAL (EA) INJECTION
Status: COMPLETED | OUTPATIENT
Start: 2024-03-11 | End: 2024-03-11

## 2024-03-11 RX ORDER — ROCURONIUM BROMIDE 10 MG/ML
100 INJECTION, SOLUTION INTRAVENOUS ONCE
Status: DISCONTINUED | OUTPATIENT
Start: 2024-03-11 | End: 2024-03-12

## 2024-03-11 RX ORDER — DIPHENHYDRAMINE HYDROCHLORIDE 50 MG/ML
25 INJECTION INTRAMUSCULAR; INTRAVENOUS
Status: COMPLETED | OUTPATIENT
Start: 2024-03-11 | End: 2024-03-11

## 2024-03-11 RX ORDER — HYDRALAZINE HYDROCHLORIDE 20 MG/ML
INJECTION INTRAMUSCULAR; INTRAVENOUS
Status: COMPLETED
Start: 2024-03-11 | End: 2024-03-11

## 2024-03-11 RX ORDER — FENTANYL CITRATE 50 UG/ML
100 INJECTION, SOLUTION INTRAMUSCULAR; INTRAVENOUS
Status: COMPLETED | OUTPATIENT
Start: 2024-03-11 | End: 2024-03-11

## 2024-03-11 RX ORDER — DEXMEDETOMIDINE HYDROCHLORIDE 4 UG/ML
0-1.4 INJECTION, SOLUTION INTRAVENOUS CONTINUOUS
Status: DISCONTINUED | OUTPATIENT
Start: 2024-03-11 | End: 2024-03-13

## 2024-03-11 RX ORDER — PROPOFOL 10 MG/ML
INJECTION, EMULSION INTRAVENOUS
Status: COMPLETED
Start: 2024-03-11 | End: 2024-03-11

## 2024-03-11 RX ORDER — DEXAMETHASONE SODIUM PHOSPHATE 4 MG/ML
6 INJECTION, SOLUTION INTRA-ARTICULAR; INTRALESIONAL; INTRAMUSCULAR; INTRAVENOUS; SOFT TISSUE EVERY 6 HOURS
Status: COMPLETED | OUTPATIENT
Start: 2024-03-12 | End: 2024-03-12

## 2024-03-11 RX ORDER — ETOMIDATE 2 MG/ML
20 INJECTION INTRAVENOUS
Status: COMPLETED | OUTPATIENT
Start: 2024-03-11 | End: 2024-03-11

## 2024-03-11 RX ORDER — FENTANYL CITRATE 50 UG/ML
50 INJECTION, SOLUTION INTRAMUSCULAR; INTRAVENOUS
Status: DISPENSED | OUTPATIENT
Start: 2024-03-11 | End: 2024-03-11

## 2024-03-11 RX ORDER — PROPOFOL 10 MG/ML
0-50 INJECTION, EMULSION INTRAVENOUS CONTINUOUS
Status: DISCONTINUED | OUTPATIENT
Start: 2024-03-11 | End: 2024-03-13

## 2024-03-11 RX ORDER — HYDROCODONE BITARTRATE AND ACETAMINOPHEN 500; 5 MG/1; MG/1
TABLET ORAL
Status: DISCONTINUED | OUTPATIENT
Start: 2024-03-11 | End: 2024-03-13 | Stop reason: HOSPADM

## 2024-03-11 RX ORDER — FENTANYL CITRATE 50 UG/ML
50 INJECTION, SOLUTION INTRAMUSCULAR; INTRAVENOUS
Status: DISCONTINUED | OUTPATIENT
Start: 2024-03-11 | End: 2024-03-13 | Stop reason: HOSPADM

## 2024-03-11 RX ADMIN — PROPOFOL 50 MCG/KG/MIN: 10 INJECTION, EMULSION INTRAVENOUS at 07:03

## 2024-03-11 RX ADMIN — DEXMEDETOMIDINE HYDROCHLORIDE 1 MCG/KG/HR: 400 INJECTION INTRAVENOUS at 07:03

## 2024-03-11 RX ADMIN — SODIUM CHLORIDE 1000 ML: 9 INJECTION, SOLUTION INTRAVENOUS at 04:03

## 2024-03-11 RX ADMIN — ETOMIDATE 20 MG: 2 INJECTION INTRAVENOUS at 04:03

## 2024-03-11 RX ADMIN — APIXABAN 5 MG: 5 TABLET, FILM COATED ORAL at 10:03

## 2024-03-11 RX ADMIN — FAMOTIDINE 20 MG: 20 TABLET, FILM COATED ORAL at 10:03

## 2024-03-11 RX ADMIN — PROPOFOL 5 MCG/KG/MIN: 10 INJECTION, EMULSION INTRAVENOUS at 04:03

## 2024-03-11 RX ADMIN — HYDRALAZINE HYDROCHLORIDE: 20 INJECTION INTRAMUSCULAR; INTRAVENOUS at 06:03

## 2024-03-11 RX ADMIN — DEXMEDETOMIDINE HYDROCHLORIDE 1.4 MCG/KG/HR: 400 INJECTION INTRAVENOUS at 06:03

## 2024-03-11 RX ADMIN — PROPOFOL 50 MCG/KG/MIN: 10 INJECTION, EMULSION INTRAVENOUS at 10:03

## 2024-03-11 RX ADMIN — METHYLPREDNISOLONE SODIUM SUCCINATE 125 MG: 125 INJECTION, POWDER, FOR SOLUTION INTRAMUSCULAR; INTRAVENOUS at 04:03

## 2024-03-11 RX ADMIN — FENTANYL CITRATE 100 MCG: 50 INJECTION, SOLUTION INTRAMUSCULAR; INTRAVENOUS at 04:03

## 2024-03-11 RX ADMIN — FENTANYL CITRATE 50 MCG: 50 INJECTION, SOLUTION INTRAMUSCULAR; INTRAVENOUS at 06:03

## 2024-03-11 RX ADMIN — FAMOTIDINE 40 MG: 10 INJECTION, SOLUTION INTRAVENOUS at 04:03

## 2024-03-11 RX ADMIN — DIPHENHYDRAMINE HYDROCHLORIDE 25 MG: 50 INJECTION INTRAMUSCULAR; INTRAVENOUS at 04:03

## 2024-03-11 NOTE — NURSING
Nurses Note -- 4 Eyes      3/11/2024   6:48 PM      Skin assessed during: Admit      [x] No Altered Skin Integrity Present    [x]Prevention Measures Documented      [] Yes- Altered Skin Integrity Present or Discovered   [] LDA Added if Not in Epic (Describe Wound)   [] New Altered Skin Integrity was Present on Admit and Documented in LDA   [] Wound Image Taken    Wound Care Consulted? No    Attending Nurse:  Rick Fan RN    Second RN/Staff Member:   Michelle aragon RN

## 2024-03-11 NOTE — H&P
[facility] - Emergency Dept  Pulmonary Critical Care Note    Patient Name: Oracio Gold  MRN: [id number]  Admission Date: 3/11/2024  Hospital Length of Stay: 0 days  Code Status: No Order  Attending Provider: Pedro Talamantes Jr., MD,*  Primary Care Provider: Jessica Loyd NP     Subjective:     HPI:  Patient is a 48-year-old male with a past medical history of Klinefelter syndrome, hypertension, anxiety, prior history pulmonary embolism in 2021, recently diagnosed recurrent pulmonary embolism +/- pulmonary infarct (admitted 3/6-3/7/24) who comes to [facility] ED on 3/11/24 for recurrent angioedema. Of note, patient eval'd in ER on 3/1 for testicular swelling w/ subsequent testicular US revealing mild RT sided varicocele, Rx Bactrim (switched from Ciprofloxacin 500 mg daily Rx'd on 2/29/24). Patient endorsed onset of rash on 03/04, with return to ER on 03/05 for recurrence of angioedema.  Patient endorsed consumption of Bactrim x3 doses at that time, with Bactrim discontinuation.  For his pulmonary embolism, patient declined thrombectomy during admission and was discharged with p.o. Eliquis 10 mg b.i.d. for 1 week with decreased to 5 mg b.i.d. thereafter.     Of note, TXA in FFP was considered by ER, but due to recent PE diagnosis 1 week ago, treatment deferred. Additionally, patient is on HRT with testosterone cypionate 200 mg/mL monthly.      Hospital Course/Significant events:  3/11/24 - ED visit and subsequent ICU admission      24 Hour Interval History:  Na    No past medical history on file.    No past surgical history on file.    Social History     Socioeconomic History    Marital status:            Objective:     Current Outpatient Medications   Medication Instructions    amLODIPine-benazepriL (LOTREL) 10-40 mg per capsule 1 capsule, Oral, Daily    apixaban (ELIQUIS) 10 mg, Oral, 2 times daily    apixaban (ELIQUIS) 5 mg, Oral, 2 times daily    [START ON 3/14/2024] apixaban  (ELIQUIS) 5 mg, Oral, 2 times daily    citalopram (CELEXA) 10 mg, Oral, Daily    diclofenac (VOLTAREN) 50 mg, Oral, 3 times daily    pantoprazole (PROTONIX) 40 mg, Oral, Daily       Current Inpatient Medications   rocuronium  100 mg Intravenous Once    sodium chloride 0.9%  1,000 mL Intravenous ED 1 Time         No intake or output data in the 24 hours ending 03/11/24 1709        Vital Signs (Most Recent):  Temp: 98.4 °F (36.9 °C) (03/11/24 1606)  Pulse: 89 (03/11/24 1647)  Resp: 20 (03/11/24 1647)  BP: (!) 178/99 (03/11/24 1647)  SpO2: 96 % (03/11/24 1647)  Body mass index is 33.23 kg/m².  Weight: 102.1 kg (225 lb) Vital Signs (24h Range):  Temp:  [98.4 °F (36.9 °C)] 98.4 °F (36.9 °C)  Pulse:  [] 89  Resp:  [19-24] 20  SpO2:  [95 %-96 %] 96 %  BP: (156-178)/() 178/99     Physical Exam  Constitutional:       Comments: Sedated with propofol, just post intubation   HENT:      Mouth/Throat:      Comments: ET secured to external os  Cardiovascular:      Rate and Rhythm: Normal rate and regular rhythm.      Heart sounds: No murmur heard.  Pulmonary:      Breath sounds: No wheezing, rhonchi or rales.   Abdominal:      General: Bowel sounds are normal.      Palpations: Abdomen is soft.   Musculoskeletal:      Cervical back: No rigidity.      Right lower leg: No edema.      Left lower leg: No edema.   Lymphadenopathy:      Cervical: No cervical adenopathy.   Skin:     General: Skin is warm and dry.   Neurological:      Comments: Sedated on propofol           Mechanical ventilation support:  Vent Mode: SIMV (PRVC) + PS (03/11/24 1630)  Ventilator Initiated: Yes (03/11/24 1630)  Set Rate: 20 BPM (03/11/24 1630)  Vt Set: 500 mL (03/11/24 1630)  Pressure Support: 10 cmH20 (03/11/24 1630)  PEEP/CPAP: 5 cmH20 (03/11/24 1630)  Oxygen Concentration (%): 70 (03/11/24 1630)  Peak Airway Pressure: 25 cmH20 (03/11/24 1630)  Total Ve: 9.9 L/m (03/11/24 1630)  F/VT Ratio<105 (RSBI): (!) 40.49 (03/11/24  1630)    Lines/Drains/Airways       Drain  Duration                  NG/OG Tube 03/11/24 1626 orogastric 16 Fr. Center mouth <1 day         Urethral Catheter 03/11/24 1644 Straight-tip 16 Fr. <1 day              Airway  Duration                  Airway - Non-Surgical 03/11/24 1624 Endotracheal Tube <1 day              Peripheral Intravenous Line  Duration                  Peripheral IV - Single Lumen 03/11/24 1611 20 G Right Antecubital <1 day         Peripheral IV - Single Lumen 03/11/24 1631 18 G Posterior;Right Hand <1 day                    Significant Labs:    Lab Results   Component Value Date    WBC 11.70 (H) 03/07/2024    HGB 14.5 03/07/2024    HCT 45.5 03/07/2024    MCV 85.8 03/07/2024     03/07/2024         BMP  Lab Results   Component Value Date     03/05/2024    K 3.9 03/05/2024    CO2 25 03/05/2024    BUN 5.3 (L) 03/05/2024    CREATININE 0.82 03/05/2024    CALCIUM 9.2 03/05/2024    EGFRNONAA >60 09/20/2021       ABG  Recent Labs   Lab 03/06/24  0940   PH 7.440   PO2 66.0*   PCO2 39.0   HCO3 26.5*           Significant Imaging:  I have reviewed all pertinent imaging within the past 24 hours.        Assessment/Plan:     Assessment  Angioedema with airway compromise requiring intubation, likely ACE inhibitor mediated   Pulmonary embolism, on anticoagulation with eliquis      Plan  Begin IV steroid  Continue airway/mechanical ventilation    DVT Prophylaxis:  GI Prophylaxis:     36 minutes of critical care was time spent personally by me on the following activities: development of treatment plan with patient or surrogate and bedside caregivers, discussions with consultants, evaluation of patient's response to treatment, examination of patient, ordering and performing treatments and interventions, ordering and review of laboratory studies, ordering and review of radiographic studies, pulse oximetry, re-evaluation of patient's condition.  This critical care time did not overlap with that of any  other provider or involve time for any procedures.     Vamsi Aj MD  Pulmonary Critical Care Medicine  [facility] - Emergency Dept

## 2024-03-11 NOTE — FIRST PROVIDER EVALUATION
Medical screening examination initiated.  I have conducted a focused provider triage encounter, findings are as follows:    Chief Complaint   Patient presents with    Angioedema     Tongue swelling x 10 minutes ago, difficulty swallowing, admitted 1 week ago with oral swelling from bactrim     Brief history of present illness:  48 y.o. male presents to the ED with tongue swelling that started about 10 minutes ago. Notes difficulty swallowing. Recent admission for similar reaction to bactrim     Vitals:    03/11/24 1606   BP: (!) 165/99   Pulse: 88   Resp: (!) 24   Temp: 98.4 °F (36.9 °C)   TempSrc: Oral   SpO2: 95%       Pertinent physical exam:  Awake, alert, ambulatory, non-labored respirations, angioedema noted    Brief workup plan:  meds, eval    Preliminary workup initiated; this workup will be continued and followed by the physician or advanced practice provider that is assigned to the patient when roomed.

## 2024-03-11 NOTE — ED NOTES
"Swelling noted to tongue causing voice changes and pt report of "having trouble breathing." ACE inhibitor reported in medication list.  Decision made to intubate pt to protect airway. Pt informed of treatment plan, agrees and verbalizes understanding. Respiratory at bedside. Suction available. Glidescope at bedside.  "

## 2024-03-11 NOTE — ED PROVIDER NOTES
Encounter Date: 3/11/2024       History     Chief Complaint   Patient presents with    Angioedema     Tongue swelling x 10 minutes ago, difficulty swallowing, admitted 1 week ago with oral swelling from bactrim     48-year-old male presents to the emergency department for evaluation of tongue swelling for the last 10 minutes.  He was seen last week ago with lip swelling, attributed to Bactrim at that time which was discontinued.  During the ED observation.  Following treatment for suspected allergic reaction, he developed hypoxia and thus CT scan was obtained which demonstrated a sizable right-sided pulmonary embolus with possible pulmonary infarction.  He was admitted, placed on heparin at that time, transition to Eliquis anticoagulation upon discharge.  Reports has been compliant with that medication.        The history is provided by the patient and medical records. The history is limited by the condition of the patient.     Review of patient's allergies indicates:   Allergen Reactions    Bactrim [sulfamethoxazole-trimethoprim] Anaphylaxis     ANGIOEDEMA     No past medical history on file.  No past surgical history on file.  No family history on file.     Review of Systems   Unable to perform ROS: Acuity of condition       Physical Exam     Initial Vitals [03/11/24 1606]   BP Pulse Resp Temp SpO2   (!) 165/99 88 (!) 24 98.4 °F (36.9 °C) 95 %      MAP       --         Physical Exam    Nursing note and vitals reviewed.  Constitutional: He appears well-developed and well-nourished.   HENT:   Head: Normocephalic and atraumatic.   Significant tongue swelling   Eyes: No scleral icterus.   Neck: Neck supple.   Cardiovascular:  Normal rate and regular rhythm.           Pulmonary/Chest:   Tachypneic, no wheezing   Abdominal: Abdomen is soft. He exhibits no distension. There is no abdominal tenderness.   Musculoskeletal:      Cervical back: Neck supple.     Neurological: He is alert and oriented to person, place, and  time. GCS score is 15. GCS eye subscore is 4. GCS verbal subscore is 5. GCS motor subscore is 6.   Skin: Skin is warm and dry. Rash noted.         ED Course   Critical Care    Date/Time: 3/11/2024 4:10 PM    Performed by: Flor Stafford MD  Authorized by: Flor Stafford MD  Direct patient critical care time: 17 minutes  Additional history critical care time: 6 minutes  Ordering / reviewing critical care time: 6 minutes  Documentation critical care time: 4 minutes  Consulting other physicians critical care time: 5 minutes  Total critical care time (exclusive of procedural time) : 38 minutes  Critical care time was exclusive of separately billable procedures and treating other patients.  Critical care was necessary to treat or prevent imminent or life-threatening deterioration of the following conditions: respiratory failure.  Critical care was time spent personally by me on the following activities: development of treatment plan with patient or surrogate, discussions with consultants, gastric intubation, interpretation of cardiac output measurements, evaluation of patient's response to treatment, examination of patient, obtaining history from patient or surrogate, ordering and performing treatments and interventions, ordering and review of laboratory studies, ordering and review of radiographic studies, pulse oximetry, re-evaluation of patient's condition and review of old charts.      Intubation    Date/Time: 3/11/2024 4:20 PM  Location procedure was performed: [facility] EMERGENCY DEPARTMENT    Performed by: Flor Stafford MD  Authorized by: Flor Stafford MD  Consent Done: Yes  Consent: Verbal consent obtained.  Risks and benefits: risks, benefits and alternatives were discussed  Consent given by: patient  Patient understanding: patient states understanding of the procedure being performed  Patient consent: the patient's understanding of the procedure matches consent given  Patient identity confirmed: name  "and verbally with patient  Time out: Immediately prior to procedure a "time out" was called to verify the correct patient, procedure, equipment, support staff and site/side marked as required.  Indications: airway protection  Intubation method: video-assisted  Patient status: paralyzed (RSI)  Preoxygenation: nonrebreather mask  Sedatives: etomidate  Paralytic: rocuronium  Laryngoscope size: Mac 3  Tube size: 7.5 mm  Tube type: cuffed  Number of attempts: 1  Cricoid pressure: no  Cords visualized: yes  Breath sounds: clear, equal and equal and absent over the epigastrium  Cuff inflated: yes  Tube secured with: ETT jaimes  Chest x-ray interpreted by me.  Chest x-ray findings: endotracheal tube in appropriate position  Patient tolerance: Patient tolerated the procedure well with no immediate complications        Labs Reviewed   COMPREHENSIVE METABOLIC PANEL - Abnormal; Notable for the following components:       Result Value    Glucose Level 113 (*)     All other components within normal limits   PROTIME-INR - Abnormal; Notable for the following components:    PT 14.6 (*)     All other components within normal limits   CBC WITH DIFFERENTIAL - Abnormal; Notable for the following components:    MCH 26.9 (*)     MCHC 31.9 (*)     RDW 17.2 (*)     MPV 10.9 (*)     Mono # 1.31 (*)     All other components within normal limits   APTT - Normal   CBC W/ AUTO DIFFERENTIAL    Narrative:     The following orders were created for panel order CBC auto differential.  Procedure                               Abnormality         Status                     ---------                               -----------         ------                     CBC with Differential[[id number]]       Abnormal            Final result                 Please view results for these tests on the individual orders.          Imaging Results              X-Ray Chest 1 View (Final result)  Result time 03/11/24 17:15:51      Final result by Amie Cardenas MD " (03/11/24 17:15:51)                   Impression:      Endotracheal tube with tip 3.7 cm above the jose.      Electronically signed by: Amie Cardenas  Date:    03/11/2024  Time:    17:15               Narrative:    EXAMINATION:  XR CHEST 1 VIEW    CLINICAL HISTORY:  ET tube placement;    TECHNIQUE:  AP chest    COMPARISON:  Chest x-ray dated 09/14/2021    FINDINGS:  The endotracheal tube has its tip 3.7 cm above the jose.  The nasogastric tube has its tip over the stomach.  The heart is normal in size.  There is a patchy left basilar airspace opacity.  There is no pleural effusion or visible pneumothorax.                                       Medications                                       methylPREDNISolone sodium succinate injection 125 mg (125 mg Intravenous Given 3/11/24 1615)   famotidine (PF) injection 40 mg (40 mg Intravenous Given 3/11/24 1615)   sodium chloride 0.9% bolus 1,000 mL 1,000 mL (1,000 mLs Intravenous New Bag 3/11/24 1615)   diphenhydrAMINE injection 25 mg (25 mg Intravenous Given 3/11/24 1615)   etomidate injection 20 mg (20 mg Intravenous Given 3/11/24 1630)   fentaNYL injection 100 mcg (100 mcg Intravenous Given 3/11/24 1630)   hydrALAZINE (APRESOLINE) 20 mg/mL injection (  Given 3/11/24 1800)     Medical Decision Making  Problems Addressed:  Angioedema due to angiotensin converting enzyme inhibitor (ACE-I): acute illness or injury that poses a threat to life or bodily functions  Anticoagulated by anticoagulation treatment: acute illness or injury  Right pulmonary embolus: acute illness or injury    Amount and/or Complexity of Data Reviewed  Labs: ordered.  Radiology: ordered.    Risk  Prescription drug management.  Decision regarding hospitalization.      ED assessment:    Mr. Gold presented with rapid onset tongue swelling, reports initially noted about 10 minutes prior to arrival.  Notably, seen in the ED last week with lip swelling and rash, attributed to Bactrim use at that  time.  He has been on Lotensin for years, no recent change to that medication.  Initially planned to try to stave off intubation with the aggressive medical management of suspected ACE inhibitor induced angioedema with FFP, TXA as well as treatment of potential allergic reaction; however, patient recently diagnosed with large right-sided pulmonary embolus with pulmonary infarction and, given potential for thrombus generation with the above treatments, decision made to intubate for airway protection.  Expeditious intubation performed, notably with mild difficulty due to posterior oropharyngeal swelling at the time of intubation however no desaturation events or other complications sustained.  Case discussed with Pulmonary Medicine/critical care medicine who accepted for admission.  Would discontinue ACE inhibitors in the future.    Differential diagnosis (including but not limited to):   Angioedema, ACE inhibitor induced versus allergic reaction, airway occlusion, impending airway collapse, pulmonary embolus, pulmonary infarct    My independent radiology interpretation:   Chest x-ray:  ET tube and OG tube in adequate position      Amount and/or Complexity of Data Reviewed  Independent historian: none   Summary of history:   External data reviewed: notes from previous admissions, notes from previous ED visits, prior imaging, and prescription medications   Summary of data reviewed: as above, seen last week w/ lip swelling, rash, treated as possible allergic reaction to bactrim. Also Dx w/ PE, started anticoagulation. Home medications include amlodipine-benazepril  Risk and benefits of testing: discussed   Labs: ordered and reviewed  Radiology: ordered and independent interpretation performed (see above or ED course)    Discussion of management or test interpretation with external provider(s): discussed with critical care medicine consultant   Summary of discussion: reviewed presentation, accepted for  admission    Risk  Prescription drug management   Decision regarding hospitalization  Emergency major surgery   Shared decision making     Critical Care  30-74 minutes     Flor VIGIL MD personally performed the history, PE, MDM, and procedures as documented above and agree with the scribe's documentation.                                     Clinical Impression:  Final diagnoses:  [T78.3XXA] Angioedema  [T78.3XXA, T46.4X5A] Angioedema due to angiotensin converting enzyme inhibitor (ACE-I) (Primary)  [I26.99] Right pulmonary embolus  [Z79.01] Anticoagulated by anticoagulation treatment          ED Disposition Condition    Admit Stable                Flor Stafford MD  03/12/24 0533

## 2024-03-11 NOTE — Clinical Note
Diagnosis: Angioedema [978005]   Future Attending Provider: JESSY WOMACK JR. [[id number]]   Admit to which facility:: [facility] [[id number]]   Reason for IP Medical Treatment  (Clinical interventions that can only be accomplished in the IP setting? ) :: airway monitoring   I certify that Inpatient services for greater than or equal to 2 midnights are medically necessary:: Yes   Plans for Post-Acute care--if anticipated (pick the single best option):: A. No post acute care anticipated at this time

## 2024-03-12 LAB
ALBUMIN SERPL-MCNC: 3.5 G/DL (ref 3.5–5)
ALBUMIN/GLOB SERPL: 0.9 RATIO (ref 1.1–2)
ALLENS TEST BLOOD GAS (OHS): YES
ALP SERPL-CCNC: 85 UNIT/L (ref 40–150)
ALT SERPL-CCNC: 21 UNIT/L (ref 0–55)
AST SERPL-CCNC: 14 UNIT/L (ref 5–34)
BASE EXCESS BLD CALC-SCNC: 3.1 MMOL/L (ref -2–2)
BASOPHILS # BLD AUTO: 0.01 X10(3)/MCL
BASOPHILS NFR BLD AUTO: 0.1 %
BILIRUB SERPL-MCNC: 0.5 MG/DL
BLOOD GAS SAMPLE TYPE (OHS): ABNORMAL
BUN SERPL-MCNC: 8 MG/DL (ref 8.9–20.6)
CA-I BLD-SCNC: 1.17 MMOL/L (ref 1.12–1.23)
CALCIUM SERPL-MCNC: 9.3 MG/DL (ref 8.4–10.2)
CHLORIDE SERPL-SCNC: 103 MMOL/L (ref 98–107)
CO2 BLDA-SCNC: 29.2 MMOL/L
CO2 SERPL-SCNC: 21 MMOL/L (ref 22–29)
COHGB MFR BLDA: 1.2 % (ref 0.5–1.5)
CREAT SERPL-MCNC: 0.77 MG/DL (ref 0.73–1.18)
DRAWN BY BLOOD GAS (OHS): ABNORMAL
EOSINOPHIL # BLD AUTO: 0 X10(3)/MCL (ref 0–0.9)
EOSINOPHIL NFR BLD AUTO: 0 %
ERYTHROCYTE [DISTWIDTH] IN BLOOD BY AUTOMATED COUNT: 16.3 % (ref 11.5–17)
GFR SERPLBLD CREATININE-BSD FMLA CKD-EPI: >60 MLS/MIN/1.73/M2
GLOBULIN SER-MCNC: 3.7 GM/DL (ref 2.4–3.5)
GLUCOSE SERPL-MCNC: 184 MG/DL (ref 74–100)
HCO3 BLDA-SCNC: 27.9 MMOL/L (ref 22–26)
HCT VFR BLD AUTO: 46.6 % (ref 42–52)
HGB BLD-MCNC: 15.1 G/DL (ref 14–18)
IMM GRANULOCYTES # BLD AUTO: 0.02 X10(3)/MCL (ref 0–0.04)
IMM GRANULOCYTES NFR BLD AUTO: 0.3 %
INHALED O2 CONCENTRATION: 35 %
LYMPHOCYTES # BLD AUTO: 0.49 X10(3)/MCL (ref 0.6–4.6)
LYMPHOCYTES NFR BLD AUTO: 6.7 %
MAGNESIUM SERPL-MCNC: 1.8 MG/DL (ref 1.6–2.6)
MCH RBC QN AUTO: 26.7 PG (ref 27–31)
MCHC RBC AUTO-ENTMCNC: 32.4 G/DL (ref 33–36)
MCV RBC AUTO: 82.3 FL (ref 80–94)
MECH RR (OHS): 20 B/MIN
METHGB MFR BLDA: 0.5 % (ref 0.4–1.5)
MODE (OHS): ABNORMAL
MONOCYTES # BLD AUTO: 0.04 X10(3)/MCL (ref 0.1–1.3)
MONOCYTES NFR BLD AUTO: 0.6 %
NEUTROPHILS # BLD AUTO: 6.7 X10(3)/MCL (ref 2.1–9.2)
NEUTROPHILS NFR BLD AUTO: 92.3 %
NRBC BLD AUTO-RTO: 0 %
O2 HB BLOOD GAS (OHS): 93.1 % (ref 94–97)
OXYGEN DEVICE BLOOD GAS (OHS): ABNORMAL
OXYHGB MFR BLDA: 15.8 G/DL (ref 12–16)
PCO2 BLDA: 42 MMHG (ref 35–45)
PEEP RESPIRATORY: 5 CMH2O
PH BLDA: 7.43 [PH] (ref 7.35–7.45)
PHOSPHATE SERPL-MCNC: 1.9 MG/DL (ref 2.3–4.7)
PLATELET # BLD AUTO: 271 X10(3)/MCL (ref 130–400)
PMV BLD AUTO: 10.1 FL (ref 7.4–10.4)
PO2 BLDA: 64 MMHG (ref 80–100)
POTASSIUM BLOOD GAS (OHS): 4.1 MMOL/L (ref 3.5–5)
POTASSIUM SERPL-SCNC: 4.6 MMOL/L (ref 3.5–5.1)
PROT SERPL-MCNC: 7.2 GM/DL (ref 6.4–8.3)
PS (OHS): 10 CMH2O
RBC # BLD AUTO: 5.66 X10(6)/MCL (ref 4.7–6.1)
SAMPLE SITE BLOOD GAS (OHS): ABNORMAL
SAO2 % BLDA: 92.7 %
SODIUM BLOOD GAS (OHS): 129 MMOL/L (ref 137–145)
SODIUM SERPL-SCNC: 137 MMOL/L (ref 136–145)
SPONT+MECH VT ON VENT: 500 ML
WBC # SPEC AUTO: 7.26 X10(3)/MCL (ref 4.5–11.5)

## 2024-03-12 PROCEDURE — 63600175 PHARM REV CODE 636 W HCPCS

## 2024-03-12 PROCEDURE — 25000003 PHARM REV CODE 250

## 2024-03-12 PROCEDURE — 85025 COMPLETE CBC W/AUTO DIFF WBC: CPT | Performed by: INTERNAL MEDICINE

## 2024-03-12 PROCEDURE — 80053 COMPREHEN METABOLIC PANEL: CPT | Performed by: INTERNAL MEDICINE

## 2024-03-12 PROCEDURE — 36600 WITHDRAWAL OF ARTERIAL BLOOD: CPT

## 2024-03-12 PROCEDURE — 94003 VENT MGMT INPAT SUBQ DAY: CPT

## 2024-03-12 PROCEDURE — 82803 BLOOD GASES ANY COMBINATION: CPT

## 2024-03-12 PROCEDURE — 84100 ASSAY OF PHOSPHORUS: CPT | Performed by: INTERNAL MEDICINE

## 2024-03-12 PROCEDURE — 94760 N-INVAS EAR/PLS OXIMETRY 1: CPT | Mod: XB

## 2024-03-12 PROCEDURE — 99900031 HC PATIENT EDUCATION (STAT)

## 2024-03-12 PROCEDURE — 63600175 PHARM REV CODE 636 W HCPCS: Performed by: EMERGENCY MEDICINE

## 2024-03-12 PROCEDURE — 99900026 HC AIRWAY MAINTENANCE (STAT)

## 2024-03-12 PROCEDURE — 27200966 HC CLOSED SUCTION SYSTEM

## 2024-03-12 PROCEDURE — 99900017 HC EXTUBATION W/PARAMETERS (STAT)

## 2024-03-12 PROCEDURE — 25000003 PHARM REV CODE 250: Performed by: INTERNAL MEDICINE

## 2024-03-12 PROCEDURE — 83735 ASSAY OF MAGNESIUM: CPT | Performed by: INTERNAL MEDICINE

## 2024-03-12 PROCEDURE — 99900035 HC TECH TIME PER 15 MIN (STAT)

## 2024-03-12 PROCEDURE — 27100171 HC OXYGEN HIGH FLOW UP TO 24 HOURS

## 2024-03-12 PROCEDURE — 20000000 HC ICU ROOM

## 2024-03-12 PROCEDURE — 94761 N-INVAS EAR/PLS OXIMETRY MLT: CPT | Mod: XB

## 2024-03-12 RX ORDER — LABETALOL HYDROCHLORIDE 5 MG/ML
10 INJECTION, SOLUTION INTRAVENOUS EVERY 4 HOURS PRN
Status: DISCONTINUED | OUTPATIENT
Start: 2024-03-12 | End: 2024-03-13 | Stop reason: HOSPADM

## 2024-03-12 RX ORDER — HYDRALAZINE HYDROCHLORIDE 20 MG/ML
10 INJECTION INTRAMUSCULAR; INTRAVENOUS EVERY 4 HOURS PRN
Status: DISCONTINUED | OUTPATIENT
Start: 2024-03-12 | End: 2024-03-13 | Stop reason: HOSPADM

## 2024-03-12 RX ORDER — ACETAMINOPHEN 325 MG/1
650 TABLET ORAL EVERY 4 HOURS PRN
Status: DISCONTINUED | OUTPATIENT
Start: 2024-03-12 | End: 2024-03-13 | Stop reason: HOSPADM

## 2024-03-12 RX ADMIN — DEXMEDETOMIDINE HYDROCHLORIDE 1.4 MCG/KG/HR: 400 INJECTION INTRAVENOUS at 08:03

## 2024-03-12 RX ADMIN — DEXMEDETOMIDINE HYDROCHLORIDE 1.4 MCG/KG/HR: 400 INJECTION INTRAVENOUS at 02:03

## 2024-03-12 RX ADMIN — DEXMEDETOMIDINE HYDROCHLORIDE 1 MCG/KG/HR: 400 INJECTION INTRAVENOUS at 12:03

## 2024-03-12 RX ADMIN — DEXAMETHASONE SODIUM PHOSPHATE 6 MG: 4 INJECTION, SOLUTION INTRA-ARTICULAR; INTRALESIONAL; INTRAMUSCULAR; INTRAVENOUS; SOFT TISSUE at 06:03

## 2024-03-12 RX ADMIN — PROPOFOL 40 MCG/KG/MIN: 10 INJECTION, EMULSION INTRAVENOUS at 02:03

## 2024-03-12 RX ADMIN — FAMOTIDINE 20 MG: 20 TABLET, FILM COATED ORAL at 08:03

## 2024-03-12 RX ADMIN — HYDRALAZINE HYDROCHLORIDE 10 MG: 20 INJECTION INTRAMUSCULAR; INTRAVENOUS at 09:03

## 2024-03-12 RX ADMIN — DEXMEDETOMIDINE HYDROCHLORIDE 1.4 MCG/KG/HR: 400 INJECTION INTRAVENOUS at 11:03

## 2024-03-12 RX ADMIN — APIXABAN 5 MG: 5 TABLET, FILM COATED ORAL at 08:03

## 2024-03-12 RX ADMIN — DEXMEDETOMIDINE HYDROCHLORIDE 1 MCG/KG/HR: 400 INJECTION INTRAVENOUS at 05:03

## 2024-03-12 RX ADMIN — ACETAMINOPHEN 650 MG: 325 TABLET, FILM COATED ORAL at 06:03

## 2024-03-12 RX ADMIN — PROPOFOL 35 MCG/KG/MIN: 10 INJECTION, EMULSION INTRAVENOUS at 05:03

## 2024-03-12 RX ADMIN — DEXAMETHASONE SODIUM PHOSPHATE 6 MG: 4 INJECTION, SOLUTION INTRA-ARTICULAR; INTRALESIONAL; INTRAMUSCULAR; INTRAVENOUS; SOFT TISSUE at 11:03

## 2024-03-12 RX ADMIN — DEXAMETHASONE SODIUM PHOSPHATE 6 MG: 4 INJECTION, SOLUTION INTRA-ARTICULAR; INTRALESIONAL; INTRAMUSCULAR; INTRAVENOUS; SOFT TISSUE at 05:03

## 2024-03-12 RX ADMIN — DEXAMETHASONE SODIUM PHOSPHATE 6 MG: 4 INJECTION, SOLUTION INTRA-ARTICULAR; INTRALESIONAL; INTRAMUSCULAR; INTRAVENOUS; SOFT TISSUE at 12:03

## 2024-03-12 NOTE — PROGRESS NOTES
[facility] - 7th Floor ICU  Pulmonary/Critical Care  Progress Note  3/12/2024    Patient Name: Oracio Gold  MRN: [id number]  Admission Date: 3/11/2024  Code Status: No Order      Subjective:     HPI:  Patient is a 48-year-old male with a past medical history of Klinefelter syndrome, hypertension, anxiety, prior history pulmonary embolism in , recently diagnosed recurrent pulmonary embolism +/- pulmonary infarct (admitted 3/6-3/7/24) who comes to [facility] ED on 3/11/24 for recurrent angioedema. Of note, patient eval'd in ER on 3/1 for testicular swelling w/ subsequent testicular US revealing mild RT sided varicocele, Rx Bactrim (switched from Ciprofloxacin 500 mg daily Rx'd on 24). Patient endorsed onset of rash on , with return to ER on  for recurrence of angioedema.  Patient endorsed consumption of Bactrim x3 doses at that time, with Bactrim discontinuation.  He also has been taking Lotensin for years.  It was noted that Lotensin was not listed on his recent hospital stay with PE, however patient states that he was taking this up until that presentation.  In ED, he was noted to have worsening swelling of his tongue, and he was intubated by ER physician due to worsening of his exam.      Hospital Course:  2024:  Intubation/mechanical ventilation    24hr Interval History:  He is afebrile.  Intake 680 cc, output 2800 cc since admission to ICU last p.m..  He remains intubated on mechanical ventilation.  No arrhythmias or hypotension.  He was receiving IV dexamethasone.    Scheduled Medications:   apixaban  5 mg Per NG tube BID    dexAMETHasone  6 mg Intravenous Q6H    famotidine  20 mg Per NG tube BID    rocuronium  100 mg Intravenous Once     PRN Medications:  0.9%  NaCl infusion (for blood administration), [] fentaNYL **FOLLOWED BY** fentaNYL  Continuous Infusions:   dexmedeTOMIDine (Precedex) infusion (titrating) 0.6 mcg/kg/hr (24 0650)    propofoL 25 mcg/kg/min  (03/12/24 0650)       No past medical history on file.    No past surgical history on file.    Objective:     Input/output:    Intake/Output Summary (Last 24 hours) at 3/12/2024 0759  Last data filed at 3/12/2024 0650  Gross per 24 hour   Intake 682.27 ml   Output 2800 ml   Net -2117.73 ml       Vital Signs (Most Recent):  Temp: 98.4 °F (36.9 °C) (03/12/24 0400)  Pulse: 63 (03/12/24 0715)  Resp: (!) 21 (03/12/24 0715)  BP: (!) 148/94 (03/12/24 0700)  SpO2: 97 % (03/12/24 0715)  Body mass index is 33.21 kg/m².  Weight: 102 kg (224 lb 13.9 oz) Vital Signs (24h Range):  Temp:  [98.2 °F (36.8 °C)-98.4 °F (36.9 °C)] 98.4 °F (36.9 °C)  Pulse:  [] 63  Resp:  [16-31] 21  SpO2:  [88 %-100 %] 97 %  BP: (100-245)/() 148/94     Physical Exam  Constitutional:       Appearance: Normal appearance.   HENT:      Mouth/Throat:      Comments: Endotracheal tube secured to external os.  Tongue appears swollen.  Cardiovascular:      Rate and Rhythm: Normal rate and regular rhythm.      Heart sounds: No murmur heard.  Pulmonary:      Breath sounds: No wheezing, rhonchi or rales.   Abdominal:      General: Bowel sounds are normal.      Palpations: Abdomen is soft.   Musculoskeletal:      Right lower leg: No edema.      Left lower leg: No edema.   Skin:     General: Skin is warm and dry.   Neurological:      Mental Status: He is alert and oriented to person, place, and time.         Lines/Drains/Airways       Drain  Duration                  NG/OG Tube 03/11/24 1626 orogastric 16 Fr. Center mouth <1 day         Urethral Catheter 03/11/24 1644 Straight-tip 16 Fr. <1 day              Airway  Duration                  Airway - Non-Surgical 03/11/24 1624 Endotracheal Tube <1 day              Peripheral Intravenous Line  Duration                  Peripheral IV - Single Lumen 03/11/24 1611 20 G Right Antecubital <1 day         Peripheral IV - Single Lumen 03/11/24 1631 18 G Posterior;Right Hand <1 day                    Vent:  Vent  Mode: SIMV (03/12/24 0549)  Ventilator Initiated: Yes (03/11/24 1630)  Set Rate: 21 BPM (03/12/24 0549)  Vt Set: 500 mL (03/12/24 0549)  Pressure Support: 10 cmH20 (03/12/24 0549)  PEEP/CPAP: 5 cmH20 (03/12/24 0549)  Oxygen Concentration (%): 35 (03/12/24 0549)  Peak Airway Pressure: 20 cmH20 (03/12/24 0549)  Total Ve: 8.7 L/m (03/12/24 0549)  F/VT Ratio<105 (RSBI): (!) 44.25 (03/11/24 2015)    ABGs:  Lab Results   Component Value Date    PH 7.430 03/12/2024    PO2 64.0 (L) 03/12/2024    PCO2 42.0 03/12/2024         Significant Labs:    Lab Results   Component Value Date    WBC 7.26 03/12/2024    HGB 15.1 03/12/2024    HCT 46.6 03/12/2024    MCV 82.3 03/12/2024     03/12/2024         Recent Labs   Lab 03/11/24  1648 03/12/24  0143    137   K 4.4 4.6   CO2 25 21*   BUN 10.6 8.0*   CREATININE 0.79 0.77   CALCIUM 9.4 9.3   MG  --  1.80   PHOS  --  1.9*   AST 18 14   ALT 24 21   ALKPHOS 90 85   ALBUMIN 3.8 3.5   INR 1.2  --    PROTIME 14.6*  --    PTT 28.3  --      Imaging:   Chest x-ray (03/12/2024, my reading of images):  Endotracheal tube is adequate in placement.  There is mild elevation of the left hemidiaphragm.  There is a right upper lobe opacifications poorly circumscribed lateral apical aspect.    Assessment:     Acute angioedema, likely ACE-inhibitor mediated (has been on Lotensin for years)  Recent pulmonary embolus, moderate right clot burden with probable right upper lobe pulmonary infarction.  He was noted on chronic testosterone replacement, possible etiology of hypercoagulable state.  Right upper lobe pulmonary nodule, likely representing pulmonary infarct  Hypertension    Plan:     Will continue current airway with mechanical ventilatory support.  He did have an air leak with endotracheal tube cuff deflation, however still demonstrates significant swelling of the tongue  Continue dexamethasone  Continue full anticoagulation with Eliquis       35 minutes of critical care was time spent  personally by me on the following activities: development of treatment plan with patient or surrogate and bedside caregivers, discussions with consultants, evaluation of patient's response to treatment, examination of patient, ordering and performing treatments and interventions, ordering and review of laboratory studies, ordering and review of radiographic studies, pulse oximetry, re-evaluation of patient's condition.  This patient demonstrates a high probability for further clinical decompensation due to ongoing critical illness.  Critical care time did not overlap with that of any other provider or involve time for any procedures.       Marisa Talamantes MD, [facility]  Pulmonary/Critical Care

## 2024-03-12 NOTE — PROGRESS NOTES
Inpatient Nutrition Assessment    Admit Date: 3/11/2024   Total duration of encounter: 1 day   Patient Age: 48 y.o.    Nutrition Recommendation/Prescription     Tube feeding:  Impact Peptide 1.5 goal rate 70 ml/hr to provide  2100 kcal/d  131 g protein/d  1078 ml free water/d  (calculations based on estimated 20 hr/d run time)     Communication of Recommendations: reviewed with nurse    Nutrition Assessment     Malnutrition Assessment/Nutrition-Focused Physical Exam    Malnutrition Context: acute illness or injury (does not meet criteria) (03/12/24 1307)     Energy Intake (Malnutrition):  (unable to evaluate) (03/12/24 1307)  Weight Loss (Malnutrition):  (unable to evaluate) (03/12/24 1307)  Subcutaneous Fat (Malnutrition):  (does not meet criteria) (03/12/24 1307)           Muscle Mass (Malnutrition):  (does not meet criteria) (03/12/24 1307)                          Fluid Accumulation (Malnutrition): mild (03/12/24 1307)        A minimum of two characteristics is recommended for diagnosis of either severe or non-severe malnutrition.    Chart Review    Reason Seen: continuous nutrition monitoring    Malnutrition Screening Tool Results   Have you recently lost weight without trying?: No  Have you been eating poorly because of a decreased appetite?: No   MST Score: 0   Diagnosis:  Acute angioedema, likely ACE-inhibitor mediated (has been on Lotensin for years)  Recent pulmonary embolus, moderate right clot burden with probable right upper lobe pulmonary infarction  Right upper lobe pulmonary nodule, likely representing pulmonary infarct  Hypertension     Relevant Medical History: Klinefelter syndrome, hypertension, anxiety, prior history pulmonary embolism in 2021, recently diagnosed recurrent pulmonary embolism +/- pulmonary infarct (admitted 3/6-3/7/24)     Scheduled Medications:  apixaban, 5 mg, BID  dexAMETHasone, 6 mg, Q6H  famotidine, 20 mg, BID    Continuous Infusions:  dexmedeTOMIDine (Precedex) infusion  "(titrating), Last Rate: 1.4 mcg/kg/hr (24 1244)  propofoL, Last Rate: 15 mcg/kg/min (24 124)    PRN Medications: 0.9%  NaCl infusion (for blood administration), [] fentaNYL **FOLLOWED BY** fentaNYL, hydrALAZINE **AND** labetalol    Calorie Containing IV Medications: no significant kcals from medications at this time    Recent Labs   Lab 24  2228 24  0320 24  0431 24  1648 24  0143     --   --  139 137   K 3.9  --   --  4.4 4.6   CALCIUM 9.2  --   --  9.4 9.3   PHOS  --   --   --   --  1.9*   MG  --   --   --   --  1.80   CHLORIDE 104  --   --  100 103   CO2 25  --   --  25 21*   BUN 5.3*  --   --  10.6 8.0*   CREATININE 0.82  --   --  0.79 0.77   EGFRNORACEVR >60  --   --  >60 >60   GLUCOSE 122*  --   --  113* 184*   BILITOT 1.1  --   --  0.7 0.5   ALKPHOS 79  --   --  90 85   ALT 17  --   --  24 21   AST 21  --   --  18 14   ALBUMIN 3.6  --   --  3.8 3.5   WBC 12.03  12.03* 8.48 11.70* 9.82 7.26   HGB 16.0 15.0 14.5 15.6 15.1   HCT 50.8 48.0 45.5 48.9 46.6     Nutrition Orders:  No diet orders on file    Appetite/Oral Intake: NPO/not applicable  Factors Affecting Nutritional Intake: on mechanical ventilation  Food/Quaker/Cultural Preferences: none reported  Food Allergies: none reported  Last Bowel Movement: 24  Wound(s): no pressure injuries documented at this time     Comments    3/12/24 Patient on ventilator, receiving small amount of kcals from propofol, ok to start tube feeding per nurse, orders provided.    Anthropometrics    Height: 5' 9" (175.3 cm),    Last Weight: 102 kg (224 lb 13.9 oz) (24 0700), Weight Method: Bed Scale  BMI (Calculated): 33.2  BMI Classification: obese grade I (BMI 30-34.9)        Ideal Body Weight (IBW), Male: 160 lb     % Ideal Body Weight, Male (lb): 140.54 %                          Usual Weight Provided By: unable to obtain usual weight    Wt Readings from Last 5 Encounters:   24 102 kg (224 lb 13.9 oz) "   24 96.7 kg (213 lb 3.2 oz)   24 99.8 kg (220 lb)   21 97.6 kg (215 lb 2.7 oz)     Weight Change(s) Since Admission: stable, +edema  Wt Readings from Last 1 Encounters:   24 0700 102 kg (224 lb 13.9 oz)   24 1622 102.1 kg (225 lb)   Admit Weight: 102.1 kg (225 lb) (24 1622), Weight Method: Stated    Estimated Needs    Weight Used For Calorie Calculations: 97 kg (213 lb 13.5 oz)  Energy Calorie Requirements (kcal): 1,959.76  Energy Need Method: [place]  Weight Used For Protein Calculations: 97 kg (213 lb 13.5 oz)  Protein Requirements: 146 g, 1.5 g/kg  Fluid Requirements (mL): 1,959.76, 1 ml/kcal  Total Ve: 7.6 L/m; Temp (24hrs), Av.4 °F (36.9 °C), Min:98.2 °F (36.8 °C), Max:98.6 °F (37 °C)  Vtot (L/Min) for [place] Equation Calculation: 7.6; Max Temp for [place] Equation Calculation: 98.6 °F  Last Updated: 3/12     Enteral Nutrition Patient not receiving enteral nutrition at this time.    Parenteral Nutrition Patient not receiving parenteral nutrition support at this time.    Evaluation of Received Nutrient Intake    Calories: not meeting estimated needs  Protein: not meeting estimated needs    Patient Education Not applicable.    Nutrition Diagnosis     PES: Inadequate energy intake related to inability to consume sufficient nutrients as evidenced by less than 75% needs met. (new)    Nutrition Interventions     Intervention(s): modified composition of enteral nutrition, modified rate of enteral nutrition, and collaboration with other providers    Goal: Meet greater than 80% of nutritional needs by follow-up. (new)  Goal: Tolerate enteral feeding at goal rate by follow-up. (new)    Nutrition Goals & Monitoring     Dietitian will monitor: energy intake, enteral nutrition intake, weight, electrolyte/renal panel, and glucose/endocrine profile    Nutrition Risk/Follow-Up: moderate (follow-up in 3-5 days)   Please consult if re-assessment needed sooner.

## 2024-03-12 NOTE — NURSING
Nurses Note -- 4 Eyes      3/12/2024   4:42 PM      Skin assessed during: Daily Assessment      [x] No Altered Skin Integrity Present    [x]Prevention Measures Documented      [] Yes- Altered Skin Integrity Present or Discovered   [] LDA Added if Not in Epic (Describe Wound)   [] New Altered Skin Integrity was Present on Admit and Documented in LDA   [] Wound Image Taken    Wound Care Consulted? No    Attending Nurse:  Rick Fan RN    Second RN/Staff Member:   Alanna Pandya RN

## 2024-03-12 NOTE — PLAN OF CARE
03/12/24 0956   Discharge Assessment   Assessment Type Discharge Planning Assessment   Confirmed/corrected address, phone number and insurance Yes   Confirmed Demographics Correct on Facesheet   Source of Information family   When was your last doctors appointment? 03/08/24   Communicated LAYLA with patient/caregiver Date not available/Unable to determine   Reason For Admission Angioedema   People in Home spouse   Facility Arrived From: home   Do you expect to return to your current living situation? Yes   Do you have help at home or someone to help you manage your care at home? Yes   Who are your caregiver(s) and their phone number(s)? wifeMaria Luisa [phone]   Prior to hospitilization cognitive status: Alert/Oriented   Current cognitive status: Coma/Sedated/Intubated   Walking or Climbing Stairs Difficulty no   Dressing/Bathing Difficulty no   Home Accessibility wheelchair accessible   Equipment Currently Used at Home none   Patient currently being followed by outpatient case management? No   Do you currently have service(s) that help you manage your care at home? No   Do you take prescription medications? Yes   Do you have prescription coverage? Yes   Coverage BCBS   Do you have any problems affording any of your prescribed medications? No   Is the patient taking medications as prescribed? yes   Who is going to help you get home at discharge? wife, Maria Luisa   How do you get to doctors appointments? car, drives self   Are you on dialysis? No   Do you take coumadin? No   Discharge Plan A Home   Discharge Plan B Home   DME Needed Upon Discharge  none   Discharge Plan discussed with: Spouse/sig other   Name(s) and Number(s) Maria Luisa Gold wife [phone]   Transition of Care Barriers None   OTHER   Name(s) of People in Home Maria Luisa, wife, son Asa 20 years old and Silvina 16 years old

## 2024-03-13 VITALS
HEART RATE: 95 BPM | TEMPERATURE: 98 F | DIASTOLIC BLOOD PRESSURE: 83 MMHG | BODY MASS INDEX: 33.31 KG/M2 | SYSTOLIC BLOOD PRESSURE: 146 MMHG | WEIGHT: 224.88 LBS | OXYGEN SATURATION: 97 % | RESPIRATION RATE: 14 BRPM | HEIGHT: 69 IN

## 2024-03-13 PROBLEM — T78.3XXA ANGIOEDEMA: Status: ACTIVE | Noted: 2024-03-13

## 2024-03-13 LAB
BASOPHILS # BLD AUTO: 0 X10(3)/MCL
BASOPHILS NFR BLD AUTO: 0 %
EOSINOPHIL # BLD AUTO: 0.01 X10(3)/MCL (ref 0–0.9)
EOSINOPHIL NFR BLD AUTO: 0.1 %
ERYTHROCYTE [DISTWIDTH] IN BLOOD BY AUTOMATED COUNT: 17.3 % (ref 11.5–17)
HCT VFR BLD AUTO: 46.6 % (ref 42–52)
HGB BLD-MCNC: 15.3 G/DL (ref 14–18)
IMM GRANULOCYTES # BLD AUTO: 0.05 X10(3)/MCL (ref 0–0.04)
IMM GRANULOCYTES NFR BLD AUTO: 0.4 %
LYMPHOCYTES # BLD AUTO: 0.85 X10(3)/MCL (ref 0.6–4.6)
LYMPHOCYTES NFR BLD AUTO: 6.5 %
MCH RBC QN AUTO: 26.9 PG (ref 27–31)
MCHC RBC AUTO-ENTMCNC: 32.8 G/DL (ref 33–36)
MCV RBC AUTO: 82 FL (ref 80–94)
MONOCYTES # BLD AUTO: 1 X10(3)/MCL (ref 0.1–1.3)
MONOCYTES NFR BLD AUTO: 7.6 %
NEUTROPHILS # BLD AUTO: 11.2 X10(3)/MCL (ref 2.1–9.2)
NEUTROPHILS NFR BLD AUTO: 85.4 %
NRBC BLD AUTO-RTO: 0 %
PLATELET # BLD AUTO: 292 X10(3)/MCL (ref 130–400)
PMV BLD AUTO: 10.1 FL (ref 7.4–10.4)
RBC # BLD AUTO: 5.68 X10(6)/MCL (ref 4.7–6.1)
WBC # SPEC AUTO: 13.11 X10(3)/MCL (ref 4.5–11.5)

## 2024-03-13 PROCEDURE — 25000003 PHARM REV CODE 250: Performed by: INTERNAL MEDICINE

## 2024-03-13 PROCEDURE — 99900035 HC TECH TIME PER 15 MIN (STAT)

## 2024-03-13 PROCEDURE — 27000221 HC OXYGEN, UP TO 24 HOURS

## 2024-03-13 PROCEDURE — 94760 N-INVAS EAR/PLS OXIMETRY 1: CPT

## 2024-03-13 PROCEDURE — 63600175 PHARM REV CODE 636 W HCPCS: Performed by: INTERNAL MEDICINE

## 2024-03-13 PROCEDURE — 85025 COMPLETE CBC W/AUTO DIFF WBC: CPT | Performed by: INTERNAL MEDICINE

## 2024-03-13 RX ORDER — PREDNISONE 20 MG/1
TABLET ORAL
Qty: 5 TABLET | Refills: 0 | Status: SHIPPED | OUTPATIENT
Start: 2024-03-13 | End: 2024-03-19

## 2024-03-13 RX ORDER — PREDNISONE 20 MG/1
20 TABLET ORAL DAILY
Status: DISCONTINUED | OUTPATIENT
Start: 2024-03-13 | End: 2024-03-13 | Stop reason: HOSPADM

## 2024-03-13 RX ORDER — AMLODIPINE BESYLATE 10 MG/1
10 TABLET ORAL DAILY
Qty: 30 TABLET | Refills: 0 | OUTPATIENT
Start: 2024-03-13 | End: 2024-04-16

## 2024-03-13 RX ADMIN — PREDNISONE 20 MG: 20 TABLET ORAL at 08:03

## 2024-03-13 RX ADMIN — FAMOTIDINE 20 MG: 20 TABLET, FILM COATED ORAL at 08:03

## 2024-03-13 RX ADMIN — APIXABAN 5 MG: 5 TABLET, FILM COATED ORAL at 08:03

## 2024-03-13 NOTE — DISCHARGE SUMMARY
[facility] - 7th [facility] ICU  Pulmonology  Discharge Summary      Patient Name: Oracio Gold  MRN: [id number]  Admission Date: 3/11/2024  Hospital Length of Stay: 2 days  Discharge Date and Time:  03/13/2024 8:58 AM  Attending Physician: Pedro Talamantes Jr., MD,*   Discharging Provider: TOMMIE Russell  Primary Care Provider: Jessica Loyd NP    HPI:  Patient is a 48-year-old male with a past medical history of Klinefelter syndrome, hypertension, anxiety, prior history pulmonary embolism in 2021, recently diagnosed recurrent pulmonary embolism +/- pulmonary infarct (admitted 3/6-3/7/24) who comes to [facility] ED on 3/11/24 for recurrent angioedema. Of note, patient eval'd in ER on 3/1 for testicular swelling w/ subsequent testicular US revealing mild RT sided varicocele, Rx Bactrim (switched from Ciprofloxacin 500 mg daily Rx'd on 2/29/24). Patient endorsed onset of rash on 03/04, with return to ER on 03/05 for recurrence of angioedema.  Patient endorsed consumption of Bactrim x3 doses at that time, with Bactrim discontinuation.  He also has been taking Lotensin for years.  It was noted that Lotensin was not listed on his recent hospital stay with PE, however patient states that he was taking this up until that presentation.  In ED, he was noted to have worsening swelling of his tongue, and he was intubated by ER physician due to worsening of his exam.  He was extubated on 3/12/24. Stopped ACE-inhibitor. Started on steroids. Stable for discharge home today.     * No surgery found *    Indwelling Lines/Drains at Time of Discharge:   Lines/Drains/Airways       None                   Hospital Course:  No notes on file    Goals of Care Treatment Preferences:             Significant Labs:  All pertinent labs within the past 24 hours have been reviewed.    Significant Imaging:  I have reviewed all pertinent imaging results/findings within the past 24 hours.    Pending Diagnostic Studies:        None          Final Active Diagnoses:    Diagnosis Date Noted POA    PRINCIPAL PROBLEM:  Angioedema [T78.3XXA] 03/13/2024 Unknown      Problems Resolved During this Admission:     Assessment  Acute angioedema, likely ACE-inhibitor mediated (has been on Lotensin for years)  Recent pulmonary embolus, moderate right clot burden with probable right upper lobe pulmonary infarction.  He was noted on chronic testosterone replacement, possible etiology of hypercoagulable state.  Right upper lobe pulmonary nodule, likely representing pulmonary infarct  Hypertension    Plan:  Discharge home today. Stopped ACEi. Placed just on amlodipine. Will need to follow up with PCP in regards to hypertension management. Sent in Prednisone taper.     Discharged Condition: stable    Disposition:       Follow Up:   Follow-up Information       Cody Moncada MD Follow up in 1 week(s).    Specialty: Internal Medicine  Contact information:  [address] [facility]  [place] [address]  [phone]                           Patient Instructions:   No discharge procedures on file.  Medications:  Reconciled Home Medications:      Medication List        START taking these medications      amLODIPine 10 MG tablet  Commonly known as: NORVASC  Take 1 tablet (10 mg total) by mouth once daily.     predniSONE 20 MG tablet  Commonly known as: DELTASONE  Take 1 tablet (20 mg total) by mouth once daily for 3 days, THEN 0.5 tablets (10 mg total) once daily for 3 days.  Start taking on: March 13, 2024            CONTINUE taking these medications      * apixaban 5 mg Tab  Commonly known as: ELIQUIS  Take 2 tablets (10 mg total) by mouth 2 (two) times daily.     * apixaban 5 mg Tab  Commonly known as: ELIQUIS  Take 1 tablet (5 mg total) by mouth 2 (two) times daily.     * apixaban 5 mg Tab  Commonly known as: ELIQUIS  Take 1 tablet (5 mg total) by mouth 2 (two) times daily.  Start taking on: March 14, 2024     citalopram 10 MG tablet  Commonly  known as: CeleXA  Take 10 mg by mouth once daily.     pantoprazole 40 MG tablet  Commonly known as: PROTONIX  Take 40 mg by mouth once daily.           * This list has 3 medication(s) that are the same as other medications prescribed for you. Read the directions carefully, and ask your doctor or other care provider to review them with you.                ASK your doctor about these medications      ciprofloxacin HCl 500 MG tablet  Commonly known as: CIPRO  Take 1 tablet (500 mg total) by mouth 2 (two) times daily. for 10 days  Ask about: Should I take this medication?     diclofenac 50 MG EC tablet  Commonly known as: VOLTAREN  Take 1 tablet (50 mg total) by mouth 3 (three) times daily. for 5 days  Ask about: Should I take this medication?              TOMMIE Russell  Pulmonology  [facility] - 7th Floor ICU

## 2024-03-13 NOTE — NURSING
Nurses Note -- 4 Eyes      3/13/2024   8:58 AM      Skin assessed during: Daily Assessment      [x] No Altered Skin Integrity Present    [x]Prevention Measures Documented      [] Yes- Altered Skin Integrity Present or Discovered   [] LDA Added if Not in Epic (Describe Wound)   [] New Altered Skin Integrity was Present on Admit and Documented in LDA   [] Wound Image Taken    Wound Care Consulted? No    Attending Nurse:  Rick Fan RN    Second RN/Staff Member:   Nella Henao RN

## 2024-03-15 LAB
ABO + RH BLD: NORMAL
ABO + RH BLD: NORMAL
BLD PROD TYP BPU: NORMAL
BLD PROD TYP BPU: NORMAL
BLOOD UNIT EXPIRATION DATE: NORMAL
BLOOD UNIT EXPIRATION DATE: NORMAL
BLOOD UNIT TYPE CODE: 6200
BLOOD UNIT TYPE CODE: 6200
CROSSMATCH INTERPRETATION: NORMAL
CROSSMATCH INTERPRETATION: NORMAL
DISPENSE STATUS: NORMAL
DISPENSE STATUS: NORMAL
UNIT NUMBER: NORMAL
UNIT NUMBER: NORMAL

## 2024-04-16 ENCOUNTER — HOSPITAL ENCOUNTER (EMERGENCY)
Facility: HOSPITAL | Age: 49
Discharge: HOME | End: 2024-04-16
Attending: EMERGENCY MEDICINE
Payer: COMMERCIAL

## 2024-04-16 VITALS
HEIGHT: 69 IN | BODY MASS INDEX: 32.58 KG/M2 | DIASTOLIC BLOOD PRESSURE: 77 MMHG | TEMPERATURE: 99 F | SYSTOLIC BLOOD PRESSURE: 141 MMHG | HEART RATE: 62 BPM | WEIGHT: 220 LBS | RESPIRATION RATE: 17 BRPM | OXYGEN SATURATION: 95 %

## 2024-04-16 DIAGNOSIS — R05.9 COUGH: ICD-10-CM

## 2024-04-16 DIAGNOSIS — Z72.0 TOBACCO USER: ICD-10-CM

## 2024-04-16 DIAGNOSIS — T78.3XXA ANGIOEDEMA, INITIAL ENCOUNTER: Primary | ICD-10-CM

## 2024-04-16 LAB
ALBUMIN SERPL-MCNC: 3.8 G/DL (ref 3.5–5)
ALBUMIN/GLOB SERPL: 1.2 RATIO (ref 1.1–2)
ALP SERPL-CCNC: 94 UNIT/L (ref 40–150)
ALT SERPL-CCNC: 14 UNIT/L (ref 0–55)
AST SERPL-CCNC: 17 UNIT/L (ref 5–34)
BASOPHILS # BLD AUTO: 0.04 X10(3)/MCL
BASOPHILS NFR BLD AUTO: 0.5 %
BILIRUB SERPL-MCNC: 0.8 MG/DL
BUN SERPL-MCNC: 10.1 MG/DL (ref 8.9–20.6)
C4 SERPL-MCNC: 29 MG/DL (ref 13–46)
CALCIUM SERPL-MCNC: 10.1 MG/DL (ref 8.4–10.2)
CHLORIDE SERPL-SCNC: 104 MMOL/L (ref 98–107)
CO2 SERPL-SCNC: 25 MMOL/L (ref 22–29)
CREAT SERPL-MCNC: 0.79 MG/DL (ref 0.73–1.18)
CRP SERPL-MCNC: 3 MG/L
EOSINOPHIL # BLD AUTO: 0.05 X10(3)/MCL (ref 0–0.9)
EOSINOPHIL NFR BLD AUTO: 0.6 %
ERYTHROCYTE [DISTWIDTH] IN BLOOD BY AUTOMATED COUNT: 19.4 % (ref 11.5–17)
GFR SERPLBLD CREATININE-BSD FMLA CKD-EPI: >60 MLS/MIN/1.73/M2
GLOBULIN SER-MCNC: 3.3 GM/DL (ref 2.4–3.5)
GLUCOSE SERPL-MCNC: 121 MG/DL (ref 74–100)
HCT VFR BLD AUTO: 46.9 % (ref 42–52)
HGB BLD-MCNC: 15.1 G/DL (ref 14–18)
IMM GRANULOCYTES # BLD AUTO: 0.02 X10(3)/MCL (ref 0–0.04)
IMM GRANULOCYTES NFR BLD AUTO: 0.3 %
LYMPHOCYTES # BLD AUTO: 1.49 X10(3)/MCL (ref 0.6–4.6)
LYMPHOCYTES NFR BLD AUTO: 19 %
MCH RBC QN AUTO: 25.9 PG (ref 27–31)
MCHC RBC AUTO-ENTMCNC: 32.2 G/DL (ref 33–36)
MCV RBC AUTO: 80.6 FL (ref 80–94)
MONOCYTES # BLD AUTO: 0.88 X10(3)/MCL (ref 0.1–1.3)
MONOCYTES NFR BLD AUTO: 11.2 %
NEUTROPHILS # BLD AUTO: 5.38 X10(3)/MCL (ref 2.1–9.2)
NEUTROPHILS NFR BLD AUTO: 68.4 %
NRBC BLD AUTO-RTO: 0 %
PLATELET # BLD AUTO: 212 X10(3)/MCL (ref 130–400)
PMV BLD AUTO: 9.7 FL (ref 7.4–10.4)
POTASSIUM SERPL-SCNC: 4.4 MMOL/L (ref 3.5–5.1)
PROT SERPL-MCNC: 7.1 GM/DL (ref 6.4–8.3)
RBC # BLD AUTO: 5.82 X10(6)/MCL (ref 4.7–6.1)
SODIUM SERPL-SCNC: 139 MMOL/L (ref 136–145)
WBC # SPEC AUTO: 7.86 X10(3)/MCL (ref 4.5–11.5)

## 2024-04-16 PROCEDURE — 85025 COMPLETE CBC W/AUTO DIFF WBC: CPT

## 2024-04-16 PROCEDURE — 25000242 PHARM REV CODE 250 ALT 637 W/ HCPCS: Performed by: EMERGENCY MEDICINE

## 2024-04-16 PROCEDURE — 94760 N-INVAS EAR/PLS OXIMETRY 1: CPT

## 2024-04-16 PROCEDURE — 99900035 HC TECH TIME PER 15 MIN (STAT)

## 2024-04-16 PROCEDURE — 99284 EMERGENCY DEPT VISIT MOD MDM: CPT | Mod: 25

## 2024-04-16 PROCEDURE — 83883 ASSAY NEPHELOMETRY NOT SPEC: CPT | Performed by: EMERGENCY MEDICINE

## 2024-04-16 PROCEDURE — 94640 AIRWAY INHALATION TREATMENT: CPT

## 2024-04-16 PROCEDURE — 63600175 PHARM REV CODE 636 W HCPCS: Performed by: EMERGENCY MEDICINE

## 2024-04-16 PROCEDURE — 96375 TX/PRO/DX INJ NEW DRUG ADDON: CPT

## 2024-04-16 PROCEDURE — 83520 IMMUNOASSAY QUANT NOS NONAB: CPT | Performed by: EMERGENCY MEDICINE

## 2024-04-16 PROCEDURE — 96374 THER/PROPH/DIAG INJ IV PUSH: CPT

## 2024-04-16 PROCEDURE — 86140 C-REACTIVE PROTEIN: CPT | Performed by: EMERGENCY MEDICINE

## 2024-04-16 PROCEDURE — 99900031 HC PATIENT EDUCATION (STAT)

## 2024-04-16 PROCEDURE — 80053 COMPREHEN METABOLIC PANEL: CPT

## 2024-04-16 PROCEDURE — 86160 COMPLEMENT ANTIGEN: CPT | Performed by: EMERGENCY MEDICINE

## 2024-04-16 RX ORDER — DIPHENHYDRAMINE HYDROCHLORIDE 50 MG/ML
25 INJECTION INTRAMUSCULAR; INTRAVENOUS
Status: COMPLETED | OUTPATIENT
Start: 2024-04-16 | End: 2024-04-16

## 2024-04-16 RX ORDER — CETIRIZINE HYDROCHLORIDE 10 MG/1
10 TABLET ORAL DAILY
Qty: 7 TABLET | Refills: 0 | Status: SHIPPED | OUTPATIENT
Start: 2024-04-16 | End: 2024-04-23

## 2024-04-16 RX ORDER — ALBUTEROL SULFATE 90 UG/1
1-2 AEROSOL, METERED RESPIRATORY (INHALATION) EVERY 6 HOURS PRN
Qty: 6.7 G | Refills: 0 | Status: SHIPPED | OUTPATIENT
Start: 2024-04-16 | End: 2025-04-16

## 2024-04-16 RX ORDER — ALBUTEROL SULFATE 0.83 MG/ML
2.5 SOLUTION RESPIRATORY (INHALATION)
Status: COMPLETED | OUTPATIENT
Start: 2024-04-16 | End: 2024-04-16

## 2024-04-16 RX ORDER — EPINEPHRINE 0.3 MG/.3ML
1 INJECTION SUBCUTANEOUS
Qty: 2 EACH | Refills: 1 | Status: SHIPPED | OUTPATIENT
Start: 2024-04-16 | End: 2025-04-16

## 2024-04-16 RX ORDER — HYDROCHLOROTHIAZIDE 25 MG/1
25 TABLET ORAL DAILY
Qty: 30 TABLET | Refills: 11 | Status: SHIPPED | OUTPATIENT
Start: 2024-04-16 | End: 2025-04-16

## 2024-04-16 RX ORDER — METHYLPREDNISOLONE SOD SUCC 125 MG
125 VIAL (EA) INJECTION
Status: COMPLETED | OUTPATIENT
Start: 2024-04-16 | End: 2024-04-16

## 2024-04-16 RX ORDER — PREDNISONE 20 MG/1
40 TABLET ORAL DAILY
Qty: 14 TABLET | Refills: 0 | Status: SHIPPED | OUTPATIENT
Start: 2024-04-16

## 2024-04-16 RX ORDER — HYDROXYZINE HYDROCHLORIDE 25 MG/1
25 TABLET, FILM COATED ORAL 3 TIMES DAILY
Qty: 15 TABLET | Refills: 0 | Status: SHIPPED | OUTPATIENT
Start: 2024-04-16 | End: 2024-04-21

## 2024-04-16 RX ADMIN — ALBUTEROL SULFATE 2.5 MG: 2.5 SOLUTION RESPIRATORY (INHALATION) at 06:04

## 2024-04-16 RX ADMIN — DIPHENHYDRAMINE HYDROCHLORIDE 25 MG: 50 INJECTION INTRAMUSCULAR; INTRAVENOUS at 04:04

## 2024-04-16 RX ADMIN — METHYLPREDNISOLONE SODIUM SUCCINATE 125 MG: 125 INJECTION, POWDER, FOR SOLUTION INTRAMUSCULAR; INTRAVENOUS at 04:04

## 2024-04-16 NOTE — FIRST PROVIDER EVALUATION
"Medical screening examination initiated.  I have conducted a focused provider triage encounter, findings are as follows:    Brief history of present illness:  48 year old male presents to ER with facial swelling onset 20 minutes ago. Patient denies any new medications. Patient reports he was admitted her last month due to angioedema and was placed on ventilator. Denies SOB, throat swelling     Vitals:    04/16/24 1419   BP: (!) 149/89   Pulse: (!) 58   Resp: 20   Temp: 98.5 °F (36.9 °C)   TempSrc: Oral   SpO2: 97%   Weight: 99.8 kg (220 lb)   Height: 5' 9" (1.753 m)       Pertinent physical exam:  Awake and alert, NAD    Brief workup plan:  Labs, exam     Preliminary workup initiated; this workup will be continued and followed by the physician or advanced practice provider that is assigned to the patient when roomed.  "

## 2024-04-16 NOTE — ED PROVIDER NOTES
Encounter Date: 4/16/2024    SCRIBE #1 NOTE: I, Marco A Trent, am scribing for, and in the presence of,  Zeyad Doanldson MD. I have scribed the following portions of the note - Other sections scribed: HPI, ROS,PE.       History     Chief Complaint   Patient presents with    Facial Swelling    Allergic Reaction     C/o facial swelling about 20 mins ago. Denies tongue swelling & difficulty swallowing. States this is the third time this has happened even with med changes. Pt had reaction to ace inhibitors last time. Unknown source of reaction this time.      Pt is a 48 y.o. male with a pMHx of GERD, HTN, anxiety, and two instances of pulmonary emboli in the past (most recently in March this year now on eliquis) presents to the ED complaining of facial swelling onset today. Pt states this has happened a few times over the past months. The first time it happened was 03/05, pt was seen here.  At that time he was also recently started on Bactrim and he was thought to have a possible anaphylactic allergic reaction.  Pulmonary embolus found at that time.  Few weeks later Pt arrived for tongue swelling and was put on a ventilator.Pt had his ACE inhibitor discontinued following this visit.  Today he noticed swelling in his lower face and lip and came immediately to the emergency department.  He states his swelling is much less severe upon this visit and remarks that his symptoms have improved drastically since he arrived to the ED. Pt takes 5 mg of Eliquis qAM.  Patient was also on nebivolol amlodipine    The history is provided by the patient. No  was used.     Review of patient's allergies indicates:   Allergen Reactions    Ace inhibitors Swelling    Arb-angiotensin receptor antagonist Swelling    Bactrim [sulfamethoxazole-trimethoprim] Anaphylaxis     ANGIOEDEMA     No past medical history on file.  No past surgical history on file.  No family history on file.     Review of Systems   Constitutional:   Negative for chills and fever.   HENT:  Positive for facial swelling.         Lip swelling   Respiratory:  Negative for cough and shortness of breath.    Cardiovascular:  Negative for chest pain.   Gastrointestinal:  Negative for abdominal pain, nausea and vomiting.   Musculoskeletal:  Negative for myalgias.   Neurological:  Negative for syncope and headaches.   All other systems reviewed and are negative.      Physical Exam     Initial Vitals [04/16/24 1419]   BP Pulse Resp Temp SpO2   (!) 149/89 (!) 58 20 98.5 °F (36.9 °C) 97 %      MAP       --         Physical Exam    Constitutional: He appears well-developed and well-nourished. No distress.   HENT:   Head: Normocephalic and atraumatic.   Minimal swelling to the left lower lip, no tongue swelling, no oropharyngeal swelling.   Cardiovascular:  Normal rate.           Pulmonary/Chest: No respiratory distress. He has no wheezes. He has no rhonchi. He exhibits no tenderness.   Abdominal: Abdomen is soft. He exhibits no distension. There is no abdominal tenderness. There is no rebound and no guarding.   Musculoskeletal:         General: Normal range of motion.     Neurological: He is alert and oriented to person, place, and time. He has normal strength.   Skin: Skin is warm and dry.   Psychiatric: He has a normal mood and affect.         ED Course   Procedures  Labs Reviewed   COMPREHENSIVE METABOLIC PANEL - Abnormal; Notable for the following components:       Result Value    Glucose Level 121 (*)     All other components within normal limits   CBC WITH DIFFERENTIAL - Abnormal; Notable for the following components:    MCH 25.9 (*)     MCHC 32.2 (*)     RDW 19.4 (*)     All other components within normal limits   C-REACTIVE PROTEIN - Normal   C4 COMPLEMENT - Normal   CBC W/ AUTO DIFFERENTIAL    Narrative:     The following orders were created for panel order CBC auto differential.  Procedure                               Abnormality         Status                      ---------                               -----------         ------                     CBC with Differential[[id number]]       Abnormal            Final result                 Please view results for these tests on the individual orders.   C1 ESTERASE INHIBITOR, FUNCTIONAL   C 1 ESTERASE INHIBITOR ANTIGEN          Imaging Results              X-Ray Chest AP Portable (Final result)  Result time 04/16/24 18:40:25      Final result by Ko Núñez MD (04/16/24 18:40:25)                   Narrative:    EXAMINATION  XR CHEST AP PORTABLE    CLINICAL HISTORY  Cough, unspecified    TECHNIQUE  A total of 1 frontal image(s) submitted of the chest.    COMPARISON  13 March 2024    FINDINGS  Lines/tubes/devices: ECG leads overlie the imaged region.    The cardiac silhouette and central vascular structures are unchanged.  The trachea is midline. No new or worsening consolidation is developed in the interval.  There is no large pleural effusion or convincing pneumothorax.    Regional osseous structures and extrathoracic soft tissues are similar.    IMPRESSION  No acute process or other adverse interval change.      Electronically signed by: Ko Núñez  Date:    04/16/2024  Time:    18:40                                     Medications   methylPREDNISolone sodium succinate injection 125 mg (125 mg Intravenous Given 4/16/24 1601)   diphenhydrAMINE injection 25 mg (25 mg Intravenous Given 4/16/24 1601)   albuterol nebulizer solution 2.5 mg (2.5 mg Nebulization Given 4/16/24 1820)     Medical Decision Making  The differential diagnosis includes, but is not limited to, Angioedema and allergic rxn.      Amount and/or Complexity of Data Reviewed  Labs: ordered.  Radiology: ordered.    Risk  OTC drugs.  Prescription drug management.            Scribe Attestation:   Scribe #1: I performed the above scribed service and the documentation accurately describes the services I performed. I attest to the accuracy of the note.    Attending  Attestation:           Physician Attestation for Scribe:  Physician Attestation Statement for Scribe #1: I, Zeyad Donaldson MD, reviewed documentation, as scribed by Marco A Trent in my presence, and it is both accurate and complete.             ED Course as of 04/16/24 1902 Tue Apr 16, 2024   1608 This is the patient's 3rd encounter wrote was in his swelling area and I saw him in his initial encounter in March at that time he also had a pulmonary embolus currently on Eliquis.  When he turned after that admission he was on an ACE inhibitor which has been discontinued.  He would another episode today less severe than previous and already almost entirely resolved by the time of my exam.  He does have just a small amount of swelling between the left lower lip and the chin no involvement arm of the tongue of the tongue or the pharynx.  He was still on amlodipine went to the less likely than an ACE inhibitor is still a potential cause will discontinue that today.  Give him steroids and Benadryl here will continue outpatient medications and I have written him a prescription of an EpiPen.  We discussed when to use it when to return to the emergency department.  I will see him some C1 and C4 testing and I advised him follow up with an immunologist.  They are going to call the primary care office in the morning to get that scheduled.  At this point symptoms are almost completely resolved and certainly much better than they were when he arrived.  I will watch him for 1 additional hour [LF]   1613 I do not want to increase his beta-blockers since his heart rate is in the 50s.  I discussed with him he should check his primary care doctor about potentially decreasing that dose.  Since we are stopping the amlodipine which was previously controlling his blood pressure well I will put him on  TZ [LF]   1812 Oxygen sat occasionally dropped to 89.  With taking some deep breaths improves to 90 6.  He does have mild expiratory  wheezing he was a chronic smoker.  Will give an albuterol treatment he was not have asthma or COPD that he was knows of.  He was not use inhalers at home.  Will give a nebulizer and re-evaluate have any hives welts or other findings concerning for anaphylaxis [LF]   1812 He has not had any additional swelling [LF]   1815 Patient reports he is strictly compliant with his Eliquis [LF]      ED Course User Index  [LF] Zeyad Donaldson MD                             Clinical Impression:  Final diagnoses:  [T78.3XXA] Angioedema, initial encounter (Primary)  [R05.9] Cough  [Z72.0] Tobacco user          ED Disposition Condition    Discharge Stable            ED Prescriptions       Medication Sig Dispense Start Date End Date Auth. Provider    predniSONE (DELTASONE) 20 MG tablet Take 2 tablets (40 mg total) by mouth once daily. Take 2 tablets daily x4 days then 1 tablet daily x4 days then 1/2 tablet daily x4 days 14 tablet 4/16/2024 -- Zeyad Donaldson MD    cetirizine (ZYRTEC) 10 MG tablet Take 1 tablet (10 mg total) by mouth once daily. for 7 days 7 tablet 4/16/2024 4/23/2024 Zeyad Donaldson MD    hydrOXYzine HCL (ATARAX) 25 MG tablet Take 1 tablet (25 mg total) by mouth 3 (three) times daily. for 5 days 15 tablet 4/16/2024 4/21/2024 Zeyad Donaldson MD    EPINEPHrine (EPIPEN) 0.3 mg/0.3 mL AtIn Inject 0.3 mLs (0.3 mg total) into the muscle as needed (anaphylaxis). 2 each 4/16/2024 4/16/2025 Zeyad Donaldson MD    hydroCHLOROthiazide (HYDRODIURIL) 25 MG tablet Take 1 tablet (25 mg total) by mouth once daily. 30 tablet 4/16/2024 4/16/2025 Zeyad Donaldson MD    albuterol (PROVENTIL/VENTOLIN HFA) 90 mcg/actuation inhaler Inhale 1-2 puffs into the lungs every 6 (six) hours as needed for Wheezing. Rescue 6.7 g 4/16/2024 4/16/2025 Zeyad Donaldson MD          Follow-up Information       Follow up With Specialties Details Why Contact Info    Primary care provider   You can call [phone] to get set up with a  local primary care provider within the next few days.If your symptoms worsen or change please return to the emergency department for re-evaluation Call your primary care provider to schedule a follow-up appointment within a week    Jessica Loyd, NP Nurse Practitioner Schedule an appointment as soon as possible for a visit   [address] [facility]  [place] [address]  [phone]                 Zeyad Donaldson MD  04/16/24 1902       Zeyad Donaldson MD  04/16/24 1905

## 2024-04-16 NOTE — Clinical Note
"Oracio "Cambriaalvarez Gold was seen and treated in our emergency department on 4/16/2024.  He may return to work on 04/17/2024.  Mr. Gold can return to work     If you have any questions or concerns, please don't hesitate to call.      Zeyad Donaldson MD"

## 2024-04-16 NOTE — DISCHARGE INSTRUCTIONS
Stop taking your amlodipine.  It is less likely than your previous blood pressure medication, but it may be causing your symptoms    You need to follow up with an immunologist for further testing.

## 2024-04-18 LAB — C1INH SERPL-MCNC: 34 MG/DL (ref 19–37)

## 2024-04-19 LAB — C1INH ACT/NOR SERPL: >90 %

## 2024-06-10 PROBLEM — I26.99 PULMONARY EMBOLISM: Status: RESOLVED | Noted: 2024-03-07 | Resolved: 2024-06-10

## 2024-07-29 DIAGNOSIS — D68.59 HYPERCOAGULABLE STATE: Primary | ICD-10-CM

## 2024-08-15 ENCOUNTER — OFFICE VISIT (OUTPATIENT)
Dept: HEMATOLOGY/ONCOLOGY | Facility: CLINIC | Age: 49
End: 2024-08-15
Payer: COMMERCIAL

## 2024-08-15 VITALS
HEART RATE: 94 BPM | RESPIRATION RATE: 14 BRPM | DIASTOLIC BLOOD PRESSURE: 84 MMHG | SYSTOLIC BLOOD PRESSURE: 126 MMHG | BODY MASS INDEX: 33.77 KG/M2 | WEIGHT: 228 LBS | OXYGEN SATURATION: 96 % | TEMPERATURE: 98 F | HEIGHT: 69 IN

## 2024-08-15 DIAGNOSIS — Z86.718 HISTORY OF DVT (DEEP VEIN THROMBOSIS): Primary | ICD-10-CM

## 2024-08-15 DIAGNOSIS — D68.59 HYPERCOAGULABLE STATE: ICD-10-CM

## 2024-08-15 DIAGNOSIS — Z86.711 HISTORY OF PULMONARY EMBOLISM: ICD-10-CM

## 2024-08-15 LAB
(HCYS)2 SERPL-MCNC: 17.8 UMOL/L (ref 5.1–15.4)
FACT VIII ACT/NOR PPP: 272 % (ref 59–191)
TT IMM BOVINE THROMBIN PPP: 19 SECONDS (ref 0–22)

## 2024-08-15 PROCEDURE — 85240 CLOT FACTOR VIII AHG 1 STAGE: CPT | Performed by: INTERNAL MEDICINE

## 2024-08-15 PROCEDURE — 36415 COLL VENOUS BLD VENIPUNCTURE: CPT | Performed by: INTERNAL MEDICINE

## 2024-08-15 PROCEDURE — 86146 BETA-2 GLYCOPROTEIN ANTIBODY: CPT | Performed by: INTERNAL MEDICINE

## 2024-08-15 PROCEDURE — 99999 PR PBB SHADOW E&M-EST. PATIENT-LVL IV: CPT | Mod: PBBFAC,,, | Performed by: INTERNAL MEDICINE

## 2024-08-15 PROCEDURE — 83090 ASSAY OF HOMOCYSTEINE: CPT | Performed by: INTERNAL MEDICINE

## 2024-08-15 PROCEDURE — 85670 THROMBIN TIME PLASMA: CPT | Performed by: INTERNAL MEDICINE

## 2024-08-15 PROCEDURE — 85306 CLOT INHIBIT PROT S FREE: CPT | Performed by: INTERNAL MEDICINE

## 2024-08-15 PROCEDURE — 85300 ANTITHROMBIN III ACTIVITY: CPT | Performed by: INTERNAL MEDICINE

## 2024-08-15 RX ORDER — AMOXICILLIN AND CLAVULANATE POTASSIUM 875; 125 MG/1; MG/1
1 TABLET, FILM COATED ORAL EVERY 12 HOURS
COMMUNITY
Start: 2024-05-31 | End: 2024-08-15

## 2024-08-15 RX ORDER — AMLODIPINE, VALSARTAN AND HYDROCHLOROTHIAZIDE 5; 160; 12.5 MG/1; MG/1; MG/1
1 TABLET ORAL DAILY
COMMUNITY
Start: 2024-08-14

## 2024-08-15 NOTE — PROGRESS NOTES
Referring physician: Amie Schultz NP  Reason for referral: h/o DVT and PE    Subjective:       Patient ID: Oracio Gold is a 48 y.o. male.    H/o Recurrent LLE DVT and Pumonary Embolism 2021  Imagin. Venous doppler LE 21--acute DVT left common femoral, sapheno-femoral junction, superficial femoral and popliteal veins.   2. Venous doppler LE 3/6/24--negative for DVT.   3. CTA chest done 3/6/24-- large defect in the right main pulmonary artery, which extends into the right lower lobe, lobar and proximal segmental division, c/w pulmonary thromboembolism. No right ventricular strain is seen, lungs are slightly heterogeneous, which may reflect small airways disease versus mosaic attenuation. Ill-defined ground-glass opacities are seen in the posterior aspect of the right upper lobe, which may reflect pneumonia. Correlate with clinical and laboratory findings. Trace right pleural effusion is seen.     Current treatment plan: Eliquis for life    Chief Complaint: Other Misc (NPH)    HPI  47 yo male with h/o recurrent DVT/PE has been referred to me for recommendations for hypercoagulable work-up and treatment. Patient has a history of Kleinfelter syndrome, heavy tobacco abuse, h/o MTHFR mutation, and is also on testosterone replacement. Patient first diagnosed with LLE DVT/PE in 2021, treated with Eliquis X 6 months. Had recurrence in 3/2024, PE seen on CTA chest. This was diagnosed during hospitalization where he had angioedema from his ACE inhibitor requiring mechanical ventilation. Patient was placed back on Eliquis but was only taking once a day. Had a recent recurrence of LLE DVT in 2024, imaging done at [facility] which we don't have. They increased his Eliquis to twice daily for 10 days and then he went back to taking daily. Discussed this does not likely represent Eliquis failure, because he was not taking correctly. Advised he increase to twice a day.     History reviewed. No pertinent past  medical history.   History reviewed. No pertinent surgical history.  No family history on file.  Social History     Socioeconomic History    Marital status:    Tobacco Use    Smoking status: Every Day     Types: Cigarettes    Smokeless tobacco: Never       Review of patient's allergies indicates:   Allergen Reactions    Ace inhibitors Swelling    Arb-angiotensin receptor antagonist Swelling    Bactrim [sulfamethoxazole-trimethoprim] Anaphylaxis     ANGIOEDEMA      Current Outpatient Medications on File Prior to Visit   Medication Sig Dispense Refill    albuterol (PROVENTIL/VENTOLIN HFA) 90 mcg/actuation inhaler Inhale 1-2 puffs into the lungs every 6 (six) hours as needed for Wheezing. Rescue 6.7 g 0    amLODIPine-valsartan-hcthiazid 5-160-12.5 mg Tab Take 1 tablet by mouth Daily.      apixaban (ELIQUIS) 5 mg Tab Take 1 tablet (5 mg total) by mouth 2 (two) times daily. 60 tablet 4    cetirizine (ZYRTEC) 10 MG tablet Take 1 tablet (10 mg total) by mouth once daily. for 7 days 7 tablet 0    citalopram (CELEXA) 10 MG tablet Take 10 mg by mouth once daily.      EPINEPHrine (EPIPEN) 0.3 mg/0.3 mL AtIn Inject 0.3 mLs (0.3 mg total) into the muscle as needed (anaphylaxis). 2 each 1    pantoprazole (PROTONIX) 40 MG tablet Take 40 mg by mouth once daily.      [DISCONTINUED] amoxicillin-clavulanate 875-125mg (AUGMENTIN) 875-125 mg per tablet Take 1 tablet by mouth every 12 (twelve) hours. (Patient not taking: Reported on 8/15/2024)      [DISCONTINUED] apixaban (ELIQUIS) 5 mg Tab Take 2 tablets (10 mg total) by mouth 2 (two) times daily. (Patient not taking: Reported on 8/15/2024) 14 tablet 0    [DISCONTINUED] apixaban (ELIQUIS) 5 mg Tab Take 1 tablet (5 mg total) by mouth 2 (two) times daily. (Patient not taking: Reported on 8/15/2024) 60 tablet 3    [DISCONTINUED] hydroCHLOROthiazide (HYDRODIURIL) 25 MG tablet Take 1 tablet (25 mg total) by mouth once daily. (Patient not taking: Reported on 8/15/2024) 30 tablet 11     "[DISCONTINUED] predniSONE (DELTASONE) 20 MG tablet Take 2 tablets (40 mg total) by mouth once daily. Take 2 tablets daily x4 days then 1 tablet daily x4 days then 1/2 tablet daily x4 days (Patient not taking: Reported on 8/15/2024) 14 tablet 0     No current facility-administered medications on file prior to visit.      Review of Systems   Constitutional:  Negative for appetite change and unexpected weight change.   HENT:  Negative for mouth sores.    Eyes:  Negative for visual disturbance.   Respiratory:  Negative for cough and shortness of breath.    Cardiovascular:  Negative for chest pain.   Gastrointestinal:  Negative for abdominal pain and diarrhea.   Genitourinary:  Negative for frequency.   Musculoskeletal:  Negative for back pain.   Integumentary:  Negative for rash.   Neurological:  Negative for headaches.   Hematological:  Negative for adenopathy.   Psychiatric/Behavioral:  The patient is not nervous/anxious.               Vitals:    08/15/24 1328   BP: 126/84   Pulse: 94   Resp: 14   Temp: 98.2 °F (36.8 °C)   TempSrc: Oral   SpO2: 96%   Weight: 103.4 kg (228 lb)   Height: 5' 9" (1.753 m)      Physical Exam  Constitutional:       General: He is awake.      Appearance: Normal appearance.      Comments: Chronically ill-appearing male   HENT:      Head: Normocephalic and atraumatic.      Nose: Nose normal.      Mouth/Throat:      Mouth: Mucous membranes are moist.   Eyes:      General: Vision grossly intact.      Extraocular Movements: Extraocular movements intact.      Conjunctiva/sclera: Conjunctivae normal.   Cardiovascular:      Rate and Rhythm: Normal rate and regular rhythm.      Heart sounds: Normal heart sounds.   Pulmonary:      Effort: Pulmonary effort is normal.      Comments: Decreased breath sounds bilaterally  Abdominal:      General: Bowel sounds are normal. There is no distension.      Palpations: Abdomen is soft.      Tenderness: There is no abdominal tenderness.   Musculoskeletal:      " Cervical back: Normal range of motion and neck supple.      Right lower leg: No edema.      Left lower leg: No edema.   Lymphadenopathy:      Cervical: No cervical adenopathy.      Upper Body:      Right upper body: No supraclavicular adenopathy.      Left upper body: No supraclavicular adenopathy.   Skin:     General: Skin is warm.   Neurological:      Mental Status: He is alert and oriented to person, place, and time.      Motor: Motor function is intact.   Psychiatric:         Mood and Affect: Mood normal.         Speech: Speech normal.         Behavior: Behavior is cooperative.         Judgment: Judgment normal.       No visits with results within 2 Week(s) from this visit.   Latest known visit with results is:   Admission on 04/16/2024, Discharged on 04/16/2024   Component Date Value Ref Range Status    Sodium 04/16/2024 139  136 - 145 mmol/L Final    Potassium 04/16/2024 4.4  3.5 - 5.1 mmol/L Final    Chloride 04/16/2024 104  98 - 107 mmol/L Final    CO2 04/16/2024 25  22 - 29 mmol/L Final    Glucose 04/16/2024 121 (H)  74 - 100 mg/dL Final    Blood Urea Nitrogen 04/16/2024 10.1  8.9 - 20.6 mg/dL Final    Creatinine 04/16/2024 0.79  0.73 - 1.18 mg/dL Final    Calcium 04/16/2024 10.1  8.4 - 10.2 mg/dL Final    Protein Total 04/16/2024 7.1  6.4 - 8.3 gm/dL Final    Albumin 04/16/2024 3.8  3.5 - 5.0 g/dL Final    Globulin 04/16/2024 3.3  2.4 - 3.5 gm/dL Final    Albumin/Globulin Ratio 04/16/2024 1.2  1.1 - 2.0 ratio Final    Bilirubin Total 04/16/2024 0.8  <=1.5 mg/dL Final    ALP 04/16/2024 94  40 - 150 unit/L Final    ALT 04/16/2024 14  0 - 55 unit/L Final    AST 04/16/2024 17  5 - 34 unit/L Final    eGFR 04/16/2024 >60  mls/min/1.73/m2 Final    WBC 04/16/2024 7.86  4.50 - 11.50 x10(3)/mcL Final    RBC 04/16/2024 5.82  4.70 - 6.10 x10(6)/mcL Final    Hgb 04/16/2024 15.1  14.0 - 18.0 g/dL Final    Hct 04/16/2024 46.9  42.0 - 52.0 % Final    MCV 04/16/2024 80.6  80.0 - 94.0 fL Final    MCH 04/16/2024 25.9 (L)   27.0 - 31.0 pg Final    MCHC 04/16/2024 32.2 (L)  33.0 - 36.0 g/dL Final    RDW 04/16/2024 19.4 (H)  11.5 - 17.0 % Final    Platelet 04/16/2024 212  130 - 400 x10(3)/mcL Final    MPV 04/16/2024 9.7  7.4 - 10.4 fL Final    Neut % 04/16/2024 68.4  % Final    Lymph % 04/16/2024 19.0  % Final    Mono % 04/16/2024 11.2  % Final    Eos % 04/16/2024 0.6  % Final    Basophil % 04/16/2024 0.5  % Final    Lymph # 04/16/2024 1.49  0.6 - 4.6 x10(3)/mcL Final    Neut # 04/16/2024 5.38  2.1 - 9.2 x10(3)/mcL Final    Mono # 04/16/2024 0.88  0.1 - 1.3 x10(3)/mcL Final    Eos # 04/16/2024 0.05  0 - 0.9 x10(3)/mcL Final    Baso # 04/16/2024 0.04  <=0.2 x10(3)/mcL Final    IG# 04/16/2024 0.02  0 - 0.04 x10(3)/mcL Final    IG% 04/16/2024 0.3  % Final    NRBC% 04/16/2024 0.0  % Final    CRP 04/16/2024 3.00  <5.00 mg/L Final    C4 Complement 04/16/2024 29.0  13.0 - 46.0 mg/dL Final    C1 Esterase Inhib, Functional, QN 04/16/2024 >90  % Final       -------------------REFERENCE VALUE--------------------------  Normal: >67%   Equivocal: 41-67%  Abnormal: <41%     Test Performed by:  [facility]  [address] [place] [address]  : Julian Mahajan M.D. Ph.D.; CLIA# [id number]    C1 Esterase Inhibitor Antigen, S 04/16/2024 34  19 - 37 mg/dL Final       Test Performed by:  [facility]  [address] [place] [address]  : Julian Mahajan M.D. Ph.D.; CLIA# [id number]          Assessment:       1. History of DVT (deep vein thrombosis)    2. Hypercoagulable state    3. History of pulmonary embolism         Plan:       PE/DVT in 9/2021 placed on Eliquis, stopped after 6 months.  Recurrent PE 3/2024 (no DVT), put back on Eliquis, but was taking only 1x per day.   Recurrent LLE DVT 7/2024, increased to BID Eliquis then back down to once a day.     Does not represent failure since he was not taking correctly.    Recommend  to increase Eliquis to 5mg BID indefinitely.     Will obtain hypercoagulable work-up today.  F/u in 3 weeks.     NO testosterone or other hormone replacement.   Smoking cessation strongly encouraged.     Plan to repeat US and CTA 1/2025.    All questions answered at this time.    Sharon Aly MD

## 2024-08-16 LAB
B2 GLYCOPROT1 IGG SERPL IA-ACNC: <9.4 SGU
B2 GLYCOPROT1 IGM SERPL IA-ACNC: <9.4 SMU

## 2024-08-17 LAB
AT III ACT/NOR PPP CHRO: 112 % (ref 80–130)
PROT S FREE AG ACT/NOR PPP IA: 181 % (ref 65–160)

## 2024-08-26 NOTE — PROGRESS NOTES
Subjective:       Patient ID: Oracio Gold is a 48 y.o. male.    Vascular Surgeon: Dr. Hunter Batista    H/o Recurrent LLE DVT and Pumonary Embolism 2021  Imagin. Venous doppler LE 21--acute DVT left common femoral, sapheno-femoral junction, superficial femoral and popliteal veins.   2. Venous doppler LE 3/6/24--negative for DVT.   3. CTA chest done 3/6/24--large defect in the right main pulmonary artery, which extends into the right lower lobe, lobar and proximal segmental division, c/w pulmonary thromboembolism. No right ventricular strain is seen, lungs are slightly heterogeneous, which may reflect small airways disease versus mosaic attenuation. Ill-defined ground-glass opacities are seen in the posterior aspect of the right upper lobe, which may reflect pneumonia. Correlate with clinical and laboratory findings. Trace right pleural effusion is seen.  4. Venous doppler LE done at [facility]--nonocclusive partial thrombosis distal left femoral and popliteal veins, new, appears subacute to chronic in nature, incidentally detected septated cyst w/n posterior left upper calf measures 1.8cm.    Hypercoagulable work-up: 8/15/24--Cardiolipin IgG 1.1, <0.9, homocysteine 17.8 (high), AT  (normal), Beta2 glycoprotein IgG and IgM <9.4, Factor V leiden negative, Factor VIII 272 (high), Lupus anticoagulant not detected, Protein C activity 156 (high), Protein S activity >150 (high), Protein S Ag 181 (high), prothrombin gene mutation negative.      Current treatment plan: Eliquis for life    Chief Complaint: Cough (Pt reports a sore throat and a cough.)    HPI  Patient presents for follow-up of recurrent DVT/PE. He is doing good, but does c/o cough and sore throat. He is smoking less than a pack a day now, has cut back and plans to quit. I went over results from testing. He did increase Eliquis dose back to BID.     History reviewed. No pertinent past medical history.   History reviewed. No  pertinent surgical history.  No family history on file.  Social History     Socioeconomic History    Marital status:    Tobacco Use    Smoking status: Every Day     Types: Cigarettes    Smokeless tobacco: Never       Review of patient's allergies indicates:   Allergen Reactions    Ace inhibitors Swelling    Arb-angiotensin receptor antagonist Swelling    Bactrim [sulfamethoxazole-trimethoprim] Anaphylaxis     ANGIOEDEMA      Current Outpatient Medications on File Prior to Visit   Medication Sig Dispense Refill    albuterol (PROVENTIL/VENTOLIN HFA) 90 mcg/actuation inhaler Inhale 1-2 puffs into the lungs every 6 (six) hours as needed for Wheezing. Rescue 6.7 g 0    amLODIPine-valsartan-hcthiazid 5-160-12.5 mg Tab Take 1 tablet by mouth Daily.      apixaban (ELIQUIS) 5 mg Tab Take 1 tablet (5 mg total) by mouth 2 (two) times daily. 60 tablet 4    buPROPion (WELLBUTRIN SR) 150 MG TBSR 12 hr tablet Take 150 mg by mouth once daily.      cetirizine (ZYRTEC) 10 MG tablet Take 1 tablet (10 mg total) by mouth once daily. for 7 days 7 tablet 0    cholestyramine, with sugar, 4 gram Powd Take by mouth.      citalopram (CELEXA) 10 MG tablet Take 10 mg by mouth once daily.      EPINEPHrine (EPIPEN) 0.3 mg/0.3 mL AtIn Inject 0.3 mLs (0.3 mg total) into the muscle as needed (anaphylaxis). 2 each 1    pantoprazole (PROTONIX) 40 MG tablet Take 40 mg by mouth once daily.       No current facility-administered medications on file prior to visit.      Review of Systems   Constitutional:  Negative for appetite change and unexpected weight change.   HENT:  Negative for mouth sores.    Eyes:  Negative for visual disturbance.   Respiratory:  Negative for cough and shortness of breath.    Cardiovascular:  Negative for chest pain.   Gastrointestinal:  Negative for abdominal pain and diarrhea.   Genitourinary:  Negative for frequency.   Musculoskeletal:  Negative for back pain.   Integumentary:  Negative for rash.   Neurological:   "Negative for headaches.   Hematological:  Negative for adenopathy.   Psychiatric/Behavioral:  The patient is not nervous/anxious.             Vitals:    09/05/24 1044   BP: 116/79   Pulse: 86   Resp: 14   Temp: 98.1 °F (36.7 °C)   TempSrc: Oral   SpO2: 97%   Weight: 99.5 kg (219 lb 4.8 oz)   Height: 5' 9" (1.753 m)        Physical Exam  Constitutional:       General: He is awake.      Appearance: Normal appearance.      Comments: Chronically ill-appearing male   HENT:      Head: Normocephalic and atraumatic.      Nose: Nose normal.      Mouth/Throat:      Mouth: Mucous membranes are moist.   Eyes:      General: Vision grossly intact.      Extraocular Movements: Extraocular movements intact.      Conjunctiva/sclera: Conjunctivae normal.   Cardiovascular:      Rate and Rhythm: Normal rate and regular rhythm.      Heart sounds: Normal heart sounds.   Pulmonary:      Effort: Pulmonary effort is normal.      Comments: Decreased breath sounds bilaterally  Abdominal:      General: Bowel sounds are normal. There is no distension.      Palpations: Abdomen is soft.      Tenderness: There is no abdominal tenderness.   Musculoskeletal:      Cervical back: Normal range of motion and neck supple.      Right lower leg: No edema.      Left lower leg: No edema.   Lymphadenopathy:      Cervical: No cervical adenopathy.      Upper Body:      Right upper body: No supraclavicular adenopathy.      Left upper body: No supraclavicular adenopathy.   Skin:     General: Skin is warm.   Neurological:      Mental Status: He is alert and oriented to person, place, and time.      Motor: Motor function is intact.   Psychiatric:         Mood and Affect: Mood normal.         Speech: Speech normal.         Behavior: Behavior is cooperative.         Judgment: Judgment normal.       No visits with results within 2 Week(s) from this visit.   Latest known visit with results is:   Office Visit on 08/15/2024   Component Date Value Ref Range Status    Beta " 2 GP1 Ab IgG 08/15/2024 <9.4  <15.0 (Negative) SGU Final    Beta 2 GP1 Ab IgM 08/15/2024 <9.4  <15.0 (Negative) SMU Final       Test Performed by:  [facility]  [address] [place] [address]  : Ventura Dias Ph.D.; CLIA# [id number]    Cardiolipin IgG Quant 08/15/2024 1.1  <10 GPL U/mL Final      <10:   Negative   10-40: Weakly Positive   >40:   Positive    Cardiolipin IgM Quant 08/15/2024 <0.9  <10 MPL U/mL Final      <10:   Negative   10-40: Weakly Positive   >40:   Positive    Protein C Activity, P 08/15/2024 156 (H)  70 - 150 % Final    Note: Increased Protein C activity is of unknown hemostatic  significance.     -------------------ADDITIONAL INFORMATION-------------------  This test has been modified from the 's   instructions. Its performance characteristics were   determined by [facility] in a manner consistent with   CLIA requirements. This test has not been cleared or   approved by the U.S. Food and Drug Administration.     Test Performed by:  [facility] [place] [address]  : Ventura Dias Ph.D.; CLIA# [id number]    Protein S Activity, P 08/15/2024 >150 (H)  65 - 150 % Final    Anticoagulants (for example oral direct thrombin inhibitors  like dabigatran, anti-Xa inhibitors like rivaroxaban,  apixaban or edoxaban), heparin levels greater than 1 U/mL,  inhibitors of the APTT test system (for example lupus  anticoagulant), inhibitors of bovine factor V (for example  antibodies that may arise in patients treated with certain  bovine topical thrombin preparations) may produce a falsely  normal or elevated protein S activity result.  Suggest   clinical correlation and if indicated consider repeat assay  of protein S activity and/or antigen in the absence of  anticoagulation therapy.  Increased free protein S activity is of unknown  hemostatic  significance.     Test Performed by:  [address] [address]  : Ventura Dias Ph.D.; CLIA# [id number]    Protein S Ag, Free, P 08/15/2024 181 (H)  65 - 160 % Final    Increased free protein S antigen is of unknown hemostatic  consequence. False elevation of protein S antigen can be  seen in association with high titer rheumatoid factor.  For patients in whom hereditary protein S deficiency is   strongly suspected and the free plasma protein S antigen   level is normal/ elevated, consideration should be given to   testing of protein S activity, SFX / Protein S Activity,   Plasma, for detecting (rare) type II protein S deficiency.     -------------------ADDITIONAL INFORMATION-------------------  This test has been modified from the 's   instructions. Its performance characteristics were   determined by [facility] in a manner consistent with   CLIA requirements. This test has not been cleared or   approved by the U.S. Food and Drug Administration.     Test Performed by:  [facility] - [address] [address]  : Ventura Dias Ph.D.; CLIA# [id number]    Prothrombin [id number] Mutation 08/15/2024 Negative  Negative Final    PTNT Interpretation 08/15/2024 SEE COMMENTS   Final    Comment: This individual DOES NOT have the Prothrombin F2 c.*97G>A   (legacy numbering [id number]) variant. Although the   Prothrombin (F2 c.*97G>A) variant is absent, this   individual may have other genetic and environmental risk   factors for thrombosis. If indicated, consider genetic   consultation and counseling for this individual and   potentially affected family members regarding laboratory   testing.     -------------------ADDITIONAL INFORMATION-------------------  This test uses TaqMan Genotyping chemistry to amplify and   detect specific single nucleotide polymorphisms  in purified   genomic DNA.     DISCLAIMER  Patients receiving allogenic stem cell transplants prior to   having blood drawn for DNA based testing may have false   normal or abnormal results depending on the genotype of the   stem cell donor.  This test was developed and its performance characteristics   determined by [facility] in a manner consistent with CLIA   requirements. This test has not been cleared or approved by                              the U.S. Food and Drug Administration.    PTNT Reviewed By 08/15/2024 Pebbles Cantor M.D.   Final       Test Performed by:  [facility] [place] [address]  : Ventura Dias Ph.D.; CLIA# [id number]    Homocysteine 08/15/2024 17.8 (H)  5.1 - 15.4 umol/L Final    Factor VIII 08/15/2024 272 (H)  59 - 191 % Final    Factor V Leiden (R506Q) Mutation 08/15/2024 Negative  Negative Final    F5DNA Interpretation 08/15/2024 SEE COMMENTS   Final    Comment: This individual DOES NOT have the factor V Leiden F5   c.1601G>A; p.[id number] (legacy numbering [id number])   variant. Although the factor V Leiden variant is absent,   the individual may have other genetic and environmental   risk factors for venous thromboembolism. If indicated,   consider genetic consultation and counseling for this   individual and family members regarding laboratory testing.     -------------------ADDITIONAL INFORMATION-------------------  This test uses TaqMan Genotyping chemistry to amplify and   detect specific single nucleotide polymorphisms in purified   genomic DNA.     DISCLAIMER  Discrepancy between the activated protein C resistance   assay and the DNA based F5 c.1601 G>A. p.[id number] assay   may be observed in patients receiving allogenic stem cell   transplants or liver transplants.  This test was developed and its performance characteristics   determined by [facility] in a manner consistent with CLIA   requirements. This test  has not been cleared or                            approved by   the U.S. Food and Drug Administration.    F5DNA Reviewed By 08/15/2024 Pebbles Cantor M.D.   Final       Test Performed by:  [place] [id number]  : Ventura Dias Ph.D.; CLIA# [id number]    Antithrombin Activity, P 08/15/2024 112  80 - 130 % Final    Direct factor Xa inhibitor therapy (rivaroxaban (Xarelto),  apixaban (Eliquis), edoxaban (Savaysa)) may interfere with  the functional Xa based AT assay and cause an  overestimation of AT activity thus potentially masking  diagnosis of AT deficiency.  Suggest clinical correlation  and consider repeat AT testing remote from direct factor  Xa inhibitor therapy, if clinically indicated.     -------------------ADDITIONAL INFORMATION-------------------  This test has been modified from the 's   instructions. Its performance characteristics were   determined by [facility] in a manner consistent with   CLIA requirements. This test has not been cleared or   approved by the U.S. Food and Drug Administration.     Test Performed by:  [address] [address]  : Ventura Dias Ph.D.; CLIA# [id number]    DRVV Scr Ratio 08/15/2024 1.27   Final    DRVV Conf Ratio 08/15/2024 1.22   Final    DRVV Norm Ratio 08/15/2024 1.04  0.00 - 1.19 Final    Lupus Anticoag ST Calc 08/15/2024 0.5  0.0 - 7.9 seconds Final    Lupus Anticoagulant Interp 08/15/2024 Interpretative Report for Lupus Anticoagulant    Interpretation:  Lupus anticoagulant not detected.    David Hall M.D.    Final    Thrombin Time 08/15/2024 19  0 - 22 seconds Final          Assessment:       1. History of pulmonary embolism    2. History of DVT (deep vein thrombosis)      Plan:       PE/DVT in 9/2021 placed on Eliquis, stopped after 6 months.  Recurrent PE 3/2024 (no DVT), put back on  Eliquis, but was taking only 1x per day.   Recurrent LLE DVT 7/2024, increased to BID Eliquis then back down to once a day.     Does not represent failure since he was not taking correctly.    Recommended to increase Eliquis to 5mg BID indefinitely and this was done 8/15/24.  Hypercoagulable work-up negative aside from elevated homocysteine (does have MTHFR mutation), discussed that getting on vitamins does not lower risk, Elevated Factor VIII which I explained is an ongoing risk factor for recurrent Dvt.    Recommendations are the same to continue Eliquis for life.     NO testosterone or other hormone replacement.   Smoking cessation strongly encouraged. He has cut back.     F/u in 1/2025 with repeat LLE US and CTA chest at [facility] before RTC.    All questions answered at this time.    Sharon Aly MD

## 2024-09-05 ENCOUNTER — OFFICE VISIT (OUTPATIENT)
Dept: HEMATOLOGY/ONCOLOGY | Facility: CLINIC | Age: 49
End: 2024-09-05
Payer: COMMERCIAL

## 2024-09-05 VITALS
HEART RATE: 86 BPM | TEMPERATURE: 98 F | SYSTOLIC BLOOD PRESSURE: 116 MMHG | OXYGEN SATURATION: 97 % | WEIGHT: 219.31 LBS | BODY MASS INDEX: 32.48 KG/M2 | DIASTOLIC BLOOD PRESSURE: 79 MMHG | RESPIRATION RATE: 14 BRPM | HEIGHT: 69 IN

## 2024-09-05 DIAGNOSIS — Z86.718 HISTORY OF DVT (DEEP VEIN THROMBOSIS): ICD-10-CM

## 2024-09-05 DIAGNOSIS — Z86.711 HISTORY OF PULMONARY EMBOLISM: Primary | ICD-10-CM

## 2024-09-05 PROCEDURE — 99999 PR PBB SHADOW E&M-EST. PATIENT-LVL IV: CPT | Mod: PBBFAC,,, | Performed by: INTERNAL MEDICINE

## 2024-09-05 RX ORDER — BUPROPION HYDROCHLORIDE 150 MG/1
150 TABLET, EXTENDED RELEASE ORAL DAILY
COMMUNITY

## 2024-09-05 RX ORDER — CHOLESTYRAMINE 4 G/9G
POWDER, FOR SUSPENSION ORAL
COMMUNITY
Start: 2024-08-29

## 2025-01-03 ENCOUNTER — HOSPITAL ENCOUNTER (OUTPATIENT)
Dept: CARDIOLOGY | Facility: HOSPITAL | Age: 50
Discharge: HOME | End: 2025-01-03
Attending: INTERNAL MEDICINE
Payer: COMMERCIAL

## 2025-01-03 ENCOUNTER — HOSPITAL ENCOUNTER (OUTPATIENT)
Dept: RADIOLOGY | Facility: HOSPITAL | Age: 50
Discharge: HOME | End: 2025-01-03
Attending: INTERNAL MEDICINE
Payer: COMMERCIAL

## 2025-01-03 DIAGNOSIS — Z86.711 HISTORY OF PULMONARY EMBOLISM: ICD-10-CM

## 2025-01-03 DIAGNOSIS — Z86.718 HISTORY OF DVT (DEEP VEIN THROMBOSIS): ICD-10-CM

## 2025-01-03 PROCEDURE — 93971 EXTREMITY STUDY: CPT | Mod: 26,LT,, | Performed by: SPECIALIST

## 2025-01-03 PROCEDURE — 71275 CT ANGIOGRAPHY CHEST: CPT | Mod: TC

## 2025-01-03 PROCEDURE — 93971 EXTREMITY STUDY: CPT | Mod: LT

## 2025-01-03 PROCEDURE — 25500020 PHARM REV CODE 255: Performed by: INTERNAL MEDICINE

## 2025-01-03 RX ADMIN — IOHEXOL 59 ML: 350 INJECTION, SOLUTION INTRAVENOUS at 09:01

## 2025-01-03 NOTE — PROGRESS NOTES
Subjective:       Patient ID: Oracio Gold is a 49 y.o. male.    Vascular Surgeon: Dr. Hunter Batista    H/o Recurrent LLE DVT and Pumonary Embolism 2021  Imagin. Venous doppler LE 21--acute DVT left common femoral, sapheno-femoral junction, superficial femoral and popliteal veins.   2. Venous doppler LE 3/6/24--negative for DVT.   3. CTA chest done 3/6/24--large defect in the right main pulmonary artery, which extends into the right lower lobe, lobar and proximal segmental division, c/w pulmonary thromboembolism. No right ventricular strain is seen, lungs are slightly heterogeneous, which may reflect small airways disease versus mosaic attenuation. Ill-defined ground-glass opacities are seen in the posterior aspect of the right upper lobe, which may reflect pneumonia. Correlate with clinical and laboratory findings. Trace right pleural effusion is seen.  4. Venous doppler LE done at [facility] 24--nonocclusive partial thrombosis distal left femoral and popliteal veins, new, appears subacute to chronic in nature, incidentally detected septated cyst w/n posterior left upper calf measures 1.8cm.  5. CTA chest done 1/3/25--No pulmonary emboli or other acute findings, right upper and middle lobe patchy infiltrates compatible with pneumonia. Sludge and stone filled gallbladder.  6. Venous doppler LLE done 1/3/25--negative for deep and superficial thrombosis.       Hypercoagulable work-up: 8/15/24--Cardiolipin IgG 1.1, <0.9, homocysteine 17.8 (high), AT  (normal), Beta2 glycoprotein IgG and IgM <9.4, Factor V leiden negative, Factor VIII 272 (high), Lupus anticoagulant not detected, Protein C activity 156 (high), Protein S activity >150 (high), Protein S Ag 181 (high), prothrombin gene mutation negative.      Current treatment plan: Eliquis for life    Chief Complaint: 4 mo f/u    HPI  Patient presents for follow-up of recurrent DVT/PE.  He continues on Eliquis. Recent CT showed resolution of PE  but did show some patchy infiltrates c/w pneumonia, and he was recently diagnosed with the flu. He is improved from flu symptoms but had lingering productive cough. No fever. Still smoking, but is trying to continue to cut back.     History reviewed. No pertinent past medical history.   History reviewed. No pertinent surgical history.  No family history on file.  Social History     Socioeconomic History    Marital status:    Tobacco Use    Smoking status: Every Day     Types: Cigarettes    Smokeless tobacco: Never       Review of patient's allergies indicates:   Allergen Reactions    Ace inhibitors Swelling    Arb-angiotensin receptor antagonist Swelling    Bactrim [sulfamethoxazole-trimethoprim] Anaphylaxis     ANGIOEDEMA      Current Outpatient Medications on File Prior to Visit   Medication Sig Dispense Refill    albuterol (PROVENTIL/VENTOLIN HFA) 90 mcg/actuation inhaler Inhale 1-2 puffs into the lungs every 6 (six) hours as needed for Wheezing. Rescue 6.7 g 0    amLODIPine-valsartan-hcthiazid 5-160-12.5 mg Tab Take 1 tablet by mouth Daily.      apixaban (ELIQUIS) 5 mg Tab Take 1 tablet (5 mg total) by mouth 2 (two) times daily. 60 tablet 4    buPROPion (WELLBUTRIN SR) 150 MG TBSR 12 hr tablet Take 150 mg by mouth once daily.      cholestyramine, with sugar, 4 gram Powd Take by mouth.      citalopram (CELEXA) 10 MG tablet Take 10 mg by mouth once daily.      EPINEPHrine (EPIPEN) 0.3 mg/0.3 mL AtIn Inject 0.3 mLs (0.3 mg total) into the muscle as needed (anaphylaxis). 2 each 1    pantoprazole (PROTONIX) 40 MG tablet Take 40 mg by mouth once daily.      cetirizine (ZYRTEC) 10 MG tablet Take 1 tablet (10 mg total) by mouth once daily. for 7 days 7 tablet 0     No current facility-administered medications on file prior to visit.      Review of Systems   Constitutional:  Negative for appetite change and unexpected weight change.   HENT:  Negative for mouth sores.    Eyes:  Negative for visual disturbance.  "  Respiratory:  Negative for cough and shortness of breath.    Cardiovascular:  Negative for chest pain.   Gastrointestinal:  Negative for abdominal pain and diarrhea.   Genitourinary:  Negative for frequency.   Musculoskeletal:  Negative for back pain.   Integumentary:  Negative for rash.   Neurological:  Negative for headaches.   Hematological:  Negative for adenopathy.   Psychiatric/Behavioral:  The patient is not nervous/anxious.             Vitals:    01/07/25 0933   BP: 117/81   Pulse: 64   Resp: 18   Temp: 98.1 °F (36.7 °C)   TempSrc: Oral   SpO2: 97%   Weight: 101.4 kg (223 lb 8 oz)   Height: 5' 9" (1.753 m)          Physical Exam  Constitutional:       General: He is awake.      Appearance: Normal appearance.      Comments: Chronically ill-appearing male   HENT:      Head: Normocephalic and atraumatic.      Nose: Nose normal.      Mouth/Throat:      Mouth: Mucous membranes are moist.   Eyes:      General: Vision grossly intact.      Extraocular Movements: Extraocular movements intact.      Conjunctiva/sclera: Conjunctivae normal.   Cardiovascular:      Rate and Rhythm: Normal rate and regular rhythm.      Heart sounds: Normal heart sounds.   Pulmonary:      Effort: Pulmonary effort is normal.      Comments: Decreased breath sounds bilaterally  Abdominal:      General: Bowel sounds are normal. There is no distension.      Palpations: Abdomen is soft.      Tenderness: There is no abdominal tenderness.   Musculoskeletal:      Cervical back: Normal range of motion and neck supple.      Right lower leg: No edema.      Left lower leg: No edema.   Lymphadenopathy:      Cervical: No cervical adenopathy.      Upper Body:      Right upper body: No supraclavicular adenopathy.      Left upper body: No supraclavicular adenopathy.   Skin:     General: Skin is warm.   Neurological:      Mental Status: He is alert and oriented to person, place, and time.      Motor: Motor function is intact.   Psychiatric:         Mood and " Affect: Mood normal.         Speech: Speech normal.         Behavior: Behavior is cooperative.         Judgment: Judgment normal.       No visits with results within 2 Week(s) from this visit.   Latest known visit with results is:   Office Visit on 08/15/2024   Component Date Value Ref Range Status    Beta 2 GP1 Ab IgG 08/15/2024 <9.4  <15.0 (Negative) SGU Final    Beta 2 GP1 Ab IgM 08/15/2024 <9.4  <15.0 (Negative) SMU Final       Test Performed by:  [facility]  [address] [place] [address]  : Ventura Dias Ph.D.; CLIA# [id number]    Cardiolipin IgG Quant 08/15/2024 1.1  <10 GPL U/mL Final      <10:   Negative   10-40: Weakly Positive   >40:   Positive    Cardiolipin IgM Quant 08/15/2024 <0.9  <10 MPL U/mL Final      <10:   Negative   10-40: Weakly Positive   >40:   Positive    Protein C Activity, P 08/15/2024 156 (H)  70 - 150 % Final    Note: Increased Protein C activity is of unknown hemostatic  significance.     -------------------ADDITIONAL INFORMATION-------------------  This test has been modified from the 's   instructions. Its performance characteristics were   determined by [facility] in a manner consistent with   CLIA requirements. This test has not been cleared or   approved by the U.S. Food and Drug Administration.     Test Performed by:  [facility] [place] [id number]  : Ventura Dias Ph.D.; CLIA# [id number]    Protein S Activity, P 08/15/2024 >150 (H)  65 - 150 % Final    Anticoagulants (for example oral direct thrombin inhibitors  like dabigatran, anti-Xa inhibitors like rivaroxaban,  apixaban or edoxaban), heparin levels greater than 1 U/mL,  inhibitors of the APTT test system (for example lupus  anticoagulant), inhibitors of bovine factor V (for example  antibodies that may arise in patients treated with certain  bovine topical thrombin  preparations) may produce a falsely  normal or elevated protein S activity result.  Suggest   clinical correlation and if indicated consider repeat assay  of protein S activity and/or antigen in the absence of  anticoagulation therapy.  Increased free protein S activity is of unknown hemostatic  significance.     Test Performed by:  [place] [id number]  : Ventura Dias Ph.D.; CLIA# [id number]    Protein S Ag, Free, P 08/15/2024 181 (H)  65 - 160 % Final    Increased free protein S antigen is of unknown hemostatic  consequence. False elevation of protein S antigen can be  seen in association with high titer rheumatoid factor.  For patients in whom hereditary protein S deficiency is   strongly suspected and the free plasma protein S antigen   level is normal/ elevated, consideration should be given to   testing of protein S activity, SFX / Protein S Activity,   Plasma, for detecting (rare) type II protein S deficiency.     -------------------ADDITIONAL INFORMATION-------------------  This test has been modified from the 's   instructions. Its performance characteristics were   determined by [facility] in a manner consistent with   CLIA requirements. This test has not been cleared or   approved by the U.S. Food and Drug Administration.     Test Performed by:  [address] [address]  : Ventura Dias Ph.D.; CLIA# [id number]    Prothrombin [id number] Mutation 08/15/2024 Negative  Negative Final    PTNT Interpretation 08/15/2024 SEE COMMENTS   Final    Comment: This individual DOES NOT have the Prothrombin F2 c.*97G>A   (legacy numbering [id number]) variant. Although the   Prothrombin (F2 c.*97G>A) variant is absent, this   individual may have other genetic and environmental risk   factors for thrombosis. If indicated, consider genetic    consultation and counseling for this individual and   potentially affected family members regarding laboratory   testing.     -------------------ADDITIONAL INFORMATION-------------------  This test uses TaqMan Genotyping chemistry to amplify and   detect specific single nucleotide polymorphisms in purified   genomic DNA.     DISCLAIMER  Patients receiving allogenic stem cell transplants prior to   having blood drawn for DNA based testing may have false   normal or abnormal results depending on the genotype of the   stem cell donor.  This test was developed and its performance characteristics   determined by [facility] in a manner consistent with CLIA   requirements. This test has not been cleared or approved by                              the U.S. Food and Drug Administration.    PTNT Reviewed By 08/15/2024 Pebbles Cantor M.D.   Final       Test Performed by:  [place] [address]  : Ventura Dias Ph.D.; CLIA# [id number]    Homocysteine 08/15/2024 17.8 (H)  5.1 - 15.4 umol/L Final    Factor VIII 08/15/2024 272 (H)  59 - 191 % Final    Factor V Leiden (R506Q) Mutation 08/15/2024 Negative  Negative Final    F5DNA Interpretation 08/15/2024 SEE COMMENTS   Final    Comment: This individual DOES NOT have the factor V Leiden F5   c.1601G>A; p.Qfz114Mch (legacy numbering [id number])   variant. Although the factor V Leiden variant is absent,   the individual may have other genetic and environmental   risk factors for venous thromboembolism. If indicated,   consider genetic consultation and counseling for this   individual and family members regarding laboratory testing.     -------------------ADDITIONAL INFORMATION-------------------  This test uses TaqMan Genotyping chemistry to amplify and   detect specific single nucleotide polymorphisms in purified   genomic DNA.     DISCLAIMER  Discrepancy between the activated protein C resistance    assay and the DNA based F5 c.1601 G>A. p.Bdd595Irj assay   may be observed in patients receiving allogenic stem cell   transplants or liver transplants.  This test was developed and its performance characteristics   determined by [facility] in a manner consistent with CLIA   requirements. This test has not been cleared or                            approved by   the U.S. Food and Drug Administration.    F5DNA Reviewed By 08/15/2024 Pebbles Cantor M.D.   Final       Test Performed by:  [place] [address]  : Ventura Dias Ph.D.; CLIA# [id number]    Antithrombin Activity, P 08/15/2024 112  80 - 130 % Final    Direct factor Xa inhibitor therapy (rivaroxaban (Xarelto),  apixaban (Eliquis), edoxaban (Savaysa)) may interfere with  the functional Xa based AT assay and cause an  overestimation of AT activity thus potentially masking  diagnosis of AT deficiency.  Suggest clinical correlation  and consider repeat AT testing remote from direct factor  Xa inhibitor therapy, if clinically indicated.     -------------------ADDITIONAL INFORMATION-------------------  This test has been modified from the 's   instructions. Its performance characteristics were   determined by [facility] in a manner consistent with   CLIA requirements. This test has not been cleared or   approved by the U.S. Food and Drug Administration.     Test Performed by:  [place] [address]  : Ventura Dias Ph.D.; CLIA# [id number]    DRVV Scr Ratio 08/15/2024 1.27   Final    DRVV Conf Ratio 08/15/2024 1.22   Final    DRVV Norm Ratio 08/15/2024 1.04  0.00 - 1.19 Final    Lupus Anticoag ST Calc 08/15/2024 0.5  0.0 - 7.9 seconds Final    Lupus Anticoagulant Interp 08/15/2024 Interpretative Report for Lupus Anticoagulant    Interpretation:  Lupus anticoagulant not detected.    David CASTILLO  CANDIE Hall    Final    Thrombin Time 08/15/2024 19  0 - 22 seconds Final          Assessment:       1. History of pulmonary embolism    2. History of DVT (deep vein thrombosis)    3. Hypercoagulable state    4. Cough, unspecified type    5. Community acquired pneumonia of right middle lobe of lung        Plan:       PE/DVT in 9/2021 placed on Eliquis, stopped after 6 months.  Recurrent PE 3/2024 (no DVT), put back on Eliquis, but was taking only 1x per day.   Recurrent LLE DVT 7/2024, increased to BID Eliquis then back down to once a day.     Does not represent failure since he was not taking correctly.    Recommended to increase Eliquis to 5mg BID indefinitely and this was done 8/15/24.  Hypercoagulable work-up negative aside from elevated homocysteine (does have MTHFR mutation), discussed that getting on vitamins does not lower risk, Elevated Factor VIII which I explained is an ongoing risk factor for recurrent Dvt.    Recommendations are the same to continue Eliquis for life.     NO testosterone or other hormone replacement.   Smoking cessation strongly encouraged. He has cut back.   Repeat CTA chest and LLE US 1/2025 with resolved DVT/PE, but patchy developing pneumonia right middle and lower lobes.    Will send prescription for Doxycycline 100mg PO BID X 5 days.  Patient to report to PCP if his symptoms don't improve.    Plan to continue annual follow-up since he will be on Eliquis for life.  Will f/u labs done today.  F/u in 1 year with labs and visit can see NP.      All questions answered at this time.    Sharon Aly MD

## 2025-01-04 ENCOUNTER — TELEPHONE (OUTPATIENT)
Dept: HEMATOLOGY/ONCOLOGY | Facility: CLINIC | Age: 50
End: 2025-01-04
Payer: COMMERCIAL

## 2025-01-04 NOTE — TELEPHONE ENCOUNTER
CTA chest with no PE, but does show a developing pneumonia.  Called patient through answering service.  He was recently diagnosed with flu on 12/30/24 so likely pneumonia is from flu.   He is no longer running fever and he is better. Still has a cough but is better.  I did not recommend any antibiotics at this time.   But I did tell him if he starts running fever again or gets worse, he should contact his PCP and he may need antibiotics.      Sharon Aly MD  
Patient

## 2025-01-07 ENCOUNTER — LAB VISIT (OUTPATIENT)
Dept: LAB | Facility: HOSPITAL | Age: 50
End: 2025-01-07
Attending: INTERNAL MEDICINE
Payer: COMMERCIAL

## 2025-01-07 ENCOUNTER — OFFICE VISIT (OUTPATIENT)
Dept: HEMATOLOGY/ONCOLOGY | Facility: CLINIC | Age: 50
End: 2025-01-07
Payer: COMMERCIAL

## 2025-01-07 VITALS
TEMPERATURE: 98 F | SYSTOLIC BLOOD PRESSURE: 117 MMHG | HEART RATE: 64 BPM | DIASTOLIC BLOOD PRESSURE: 81 MMHG | BODY MASS INDEX: 33.1 KG/M2 | WEIGHT: 223.5 LBS | RESPIRATION RATE: 18 BRPM | OXYGEN SATURATION: 97 % | HEIGHT: 69 IN

## 2025-01-07 DIAGNOSIS — J18.9 COMMUNITY ACQUIRED PNEUMONIA OF RIGHT MIDDLE LOBE OF LUNG: ICD-10-CM

## 2025-01-07 DIAGNOSIS — D68.59 HYPERCOAGULABLE STATE: ICD-10-CM

## 2025-01-07 DIAGNOSIS — Z86.711 HISTORY OF PULMONARY EMBOLISM: Primary | ICD-10-CM

## 2025-01-07 DIAGNOSIS — Z86.718 HISTORY OF DVT (DEEP VEIN THROMBOSIS): ICD-10-CM

## 2025-01-07 DIAGNOSIS — Z86.711 HISTORY OF PULMONARY EMBOLISM: ICD-10-CM

## 2025-01-07 DIAGNOSIS — R05.9 COUGH, UNSPECIFIED TYPE: ICD-10-CM

## 2025-01-07 LAB
ALBUMIN SERPL-MCNC: 3.7 G/DL (ref 3.5–5)
ALBUMIN/GLOB SERPL: 0.8 RATIO (ref 1.1–2)
ALP SERPL-CCNC: 86 UNIT/L (ref 40–150)
ALT SERPL-CCNC: 21 UNIT/L (ref 0–55)
ANION GAP SERPL CALC-SCNC: 16 MEQ/L
AST SERPL-CCNC: 25 UNIT/L (ref 5–34)
BASOPHILS # BLD AUTO: 0.02 X10(3)/MCL
BASOPHILS NFR BLD AUTO: 0.2 %
BILIRUB SERPL-MCNC: 0.4 MG/DL
BUN SERPL-MCNC: 5.3 MG/DL (ref 8.9–20.6)
CALCIUM SERPL-MCNC: 10.6 MG/DL (ref 8.4–10.2)
CHLORIDE SERPL-SCNC: 102 MMOL/L (ref 98–107)
CO2 SERPL-SCNC: 21 MMOL/L (ref 22–29)
CREAT SERPL-MCNC: 0.73 MG/DL (ref 0.72–1.25)
CREAT/UREA NIT SERPL: 7
EOSINOPHIL # BLD AUTO: 0.04 X10(3)/MCL (ref 0–0.9)
EOSINOPHIL NFR BLD AUTO: 0.4 %
ERYTHROCYTE [DISTWIDTH] IN BLOOD BY AUTOMATED COUNT: 13.8 % (ref 11.5–17)
GFR SERPLBLD CREATININE-BSD FMLA CKD-EPI: >60 ML/MIN/1.73/M2
GLOBULIN SER-MCNC: 4.7 GM/DL (ref 2.4–3.5)
GLUCOSE SERPL-MCNC: 160 MG/DL (ref 74–100)
HCT VFR BLD AUTO: 49.4 % (ref 42–52)
HGB BLD-MCNC: 16.3 G/DL (ref 14–18)
IMM GRANULOCYTES # BLD AUTO: 0.13 X10(3)/MCL (ref 0–0.04)
IMM GRANULOCYTES NFR BLD AUTO: 1.4 %
LYMPHOCYTES # BLD AUTO: 2.23 X10(3)/MCL (ref 0.6–4.6)
LYMPHOCYTES NFR BLD AUTO: 23.2 %
MCH RBC QN AUTO: 31.5 PG (ref 27–31)
MCHC RBC AUTO-ENTMCNC: 33 G/DL (ref 33–36)
MCV RBC AUTO: 95.6 FL (ref 80–94)
MONOCYTES # BLD AUTO: 1.1 X10(3)/MCL (ref 0.1–1.3)
MONOCYTES NFR BLD AUTO: 11.4 %
NEUTROPHILS # BLD AUTO: 6.09 X10(3)/MCL (ref 2.1–9.2)
NEUTROPHILS NFR BLD AUTO: 63.4 %
PLATELET # BLD AUTO: 308 X10(3)/MCL (ref 130–400)
PMV BLD AUTO: 9.3 FL (ref 7.4–10.4)
POTASSIUM SERPL-SCNC: 4.9 MMOL/L (ref 3.5–5.1)
PROT SERPL-MCNC: 8.4 GM/DL (ref 6.4–8.3)
RBC # BLD AUTO: 5.17 X10(6)/MCL (ref 4.7–6.1)
SODIUM SERPL-SCNC: 139 MMOL/L (ref 136–145)
WBC # BLD AUTO: 9.61 X10(3)/MCL (ref 4.5–11.5)

## 2025-01-07 PROCEDURE — 85025 COMPLETE CBC W/AUTO DIFF WBC: CPT

## 2025-01-07 PROCEDURE — 36415 COLL VENOUS BLD VENIPUNCTURE: CPT

## 2025-01-07 PROCEDURE — 99999 PR PBB SHADOW E&M-EST. PATIENT-LVL IV: CPT | Mod: PBBFAC,,, | Performed by: INTERNAL MEDICINE

## 2025-01-07 PROCEDURE — 80053 COMPREHEN METABOLIC PANEL: CPT

## 2025-01-07 RX ORDER — DOXYCYCLINE HYCLATE 100 MG
100 TABLET ORAL 2 TIMES DAILY
Qty: 10 TABLET | Refills: 0 | Status: SHIPPED | OUTPATIENT
Start: 2025-01-07 | End: 2025-01-12

## 2025-01-28 ENCOUNTER — ANESTHESIA EVENT (OUTPATIENT)
Dept: SURGERY | Facility: HOSPITAL | Age: 50
End: 2025-01-28
Payer: COMMERCIAL

## 2025-01-28 ENCOUNTER — HOSPITAL ENCOUNTER (OUTPATIENT)
Facility: HOSPITAL | Age: 50
Discharge: HOME | End: 2025-01-29
Attending: EMERGENCY MEDICINE | Admitting: SURGERY
Payer: COMMERCIAL

## 2025-01-28 DIAGNOSIS — K80.20 SYMPTOMATIC CHOLELITHIASIS: Primary | ICD-10-CM

## 2025-01-28 DIAGNOSIS — R10.9 ABDOMINAL PAIN, UNSPECIFIED ABDOMINAL LOCATION: ICD-10-CM

## 2025-01-28 DIAGNOSIS — R10.13 EPIGASTRIC PAIN: ICD-10-CM

## 2025-01-28 DIAGNOSIS — R07.9 CHEST PAIN: ICD-10-CM

## 2025-01-28 DIAGNOSIS — I10 BENIGN ESSENTIAL HTN: ICD-10-CM

## 2025-01-28 DIAGNOSIS — R07.89 CHEST WALL PAIN: ICD-10-CM

## 2025-01-28 LAB
ALBUMIN SERPL-MCNC: 4 G/DL (ref 3.5–5)
ALBUMIN/GLOB SERPL: 1 RATIO (ref 1.1–2)
ALP SERPL-CCNC: 82 UNIT/L (ref 40–150)
ALT SERPL-CCNC: 19 UNIT/L (ref 0–55)
ANION GAP SERPL CALC-SCNC: 13 MEQ/L
APTT PPP: 25.4 SECONDS (ref 23.2–33.7)
AST SERPL-CCNC: 17 UNIT/L (ref 5–34)
BASOPHILS # BLD AUTO: 0.03 X10(3)/MCL
BASOPHILS NFR BLD AUTO: 0.5 %
BILIRUB SERPL-MCNC: 0.5 MG/DL
BUN SERPL-MCNC: 7.4 MG/DL (ref 8.9–20.6)
CALCIUM SERPL-MCNC: 10 MG/DL (ref 8.4–10.2)
CANCER AG19-9 SERPL-ACNC: 7.44 UNIT/ML (ref 0–37)
CEA SERPL-MCNC: <1.73 NG/ML (ref 0–3)
CHLORIDE SERPL-SCNC: 105 MMOL/L (ref 98–107)
CO2 SERPL-SCNC: 22 MMOL/L (ref 22–29)
CREAT SERPL-MCNC: 0.74 MG/DL (ref 0.72–1.25)
CREAT/UREA NIT SERPL: 10
EOSINOPHIL # BLD AUTO: 0.14 X10(3)/MCL (ref 0–0.9)
EOSINOPHIL NFR BLD AUTO: 2.3 %
ERYTHROCYTE [DISTWIDTH] IN BLOOD BY AUTOMATED COUNT: 14.7 % (ref 11.5–17)
GFR SERPLBLD CREATININE-BSD FMLA CKD-EPI: >60 ML/MIN/1.73/M2
GLOBULIN SER-MCNC: 4 GM/DL (ref 2.4–3.5)
GLUCOSE SERPL-MCNC: 152 MG/DL (ref 74–100)
HCT VFR BLD AUTO: 47.4 % (ref 42–52)
HGB BLD-MCNC: 16.1 G/DL (ref 14–18)
IMM GRANULOCYTES # BLD AUTO: 0.01 X10(3)/MCL (ref 0–0.04)
IMM GRANULOCYTES NFR BLD AUTO: 0.2 %
INR PPP: 1
LIPASE SERPL-CCNC: 37 U/L
LYMPHOCYTES # BLD AUTO: 2.95 X10(3)/MCL (ref 0.6–4.6)
LYMPHOCYTES NFR BLD AUTO: 48.8 %
MCH RBC QN AUTO: 31.6 PG (ref 27–31)
MCHC RBC AUTO-ENTMCNC: 34 G/DL (ref 33–36)
MCV RBC AUTO: 93.1 FL (ref 80–94)
MONOCYTES # BLD AUTO: 0.77 X10(3)/MCL (ref 0.1–1.3)
MONOCYTES NFR BLD AUTO: 12.7 %
NEUTROPHILS # BLD AUTO: 2.14 X10(3)/MCL (ref 2.1–9.2)
NEUTROPHILS NFR BLD AUTO: 35.5 %
NRBC BLD AUTO-RTO: 0 %
OHS QRS DURATION: 80 MS
OHS QTC CALCULATION: 418 MS
PLATELET # BLD AUTO: 236 X10(3)/MCL (ref 130–400)
PMV BLD AUTO: 9.1 FL (ref 7.4–10.4)
POTASSIUM SERPL-SCNC: 4 MMOL/L (ref 3.5–5.1)
PROT SERPL-MCNC: 8 GM/DL (ref 6.4–8.3)
PROTHROMBIN TIME: 12.5 SECONDS (ref 12.5–14.5)
RBC # BLD AUTO: 5.09 X10(6)/MCL (ref 4.7–6.1)
SODIUM SERPL-SCNC: 140 MMOL/L (ref 136–145)
TROPONIN I SERPL-MCNC: <0.01 NG/ML (ref 0–0.04)
WBC # BLD AUTO: 6.04 X10(3)/MCL (ref 4.5–11.5)

## 2025-01-28 PROCEDURE — 96374 THER/PROPH/DIAG INJ IV PUSH: CPT

## 2025-01-28 PROCEDURE — 63600175 PHARM REV CODE 636 W HCPCS: Performed by: EMERGENCY MEDICINE

## 2025-01-28 PROCEDURE — 99285 EMERGENCY DEPT VISIT HI MDM: CPT | Mod: 25

## 2025-01-28 PROCEDURE — G0378 HOSPITAL OBSERVATION PER HR: HCPCS

## 2025-01-28 PROCEDURE — 96372 THER/PROPH/DIAG INJ SC/IM: CPT | Performed by: STUDENT IN AN ORGANIZED HEALTH CARE EDUCATION/TRAINING PROGRAM

## 2025-01-28 PROCEDURE — 83690 ASSAY OF LIPASE: CPT | Performed by: EMERGENCY MEDICINE

## 2025-01-28 PROCEDURE — 93010 ELECTROCARDIOGRAM REPORT: CPT | Mod: ,,, | Performed by: INTERNAL MEDICINE

## 2025-01-28 PROCEDURE — 85730 THROMBOPLASTIN TIME PARTIAL: CPT | Performed by: EMERGENCY MEDICINE

## 2025-01-28 PROCEDURE — 93005 ELECTROCARDIOGRAM TRACING: CPT

## 2025-01-28 PROCEDURE — 63600175 PHARM REV CODE 636 W HCPCS: Mod: JZ,TB | Performed by: EMERGENCY MEDICINE

## 2025-01-28 PROCEDURE — 96375 TX/PRO/DX INJ NEW DRUG ADDON: CPT

## 2025-01-28 PROCEDURE — 85025 COMPLETE CBC W/AUTO DIFF WBC: CPT | Performed by: EMERGENCY MEDICINE

## 2025-01-28 PROCEDURE — 80053 COMPREHEN METABOLIC PANEL: CPT | Performed by: EMERGENCY MEDICINE

## 2025-01-28 PROCEDURE — 85610 PROTHROMBIN TIME: CPT | Performed by: EMERGENCY MEDICINE

## 2025-01-28 PROCEDURE — 25000003 PHARM REV CODE 250: Performed by: STUDENT IN AN ORGANIZED HEALTH CARE EDUCATION/TRAINING PROGRAM

## 2025-01-28 PROCEDURE — 82378 CARCINOEMBRYONIC ANTIGEN: CPT

## 2025-01-28 PROCEDURE — 25000003 PHARM REV CODE 250: Performed by: EMERGENCY MEDICINE

## 2025-01-28 PROCEDURE — 86301 IMMUNOASSAY TUMOR CA 19-9: CPT

## 2025-01-28 PROCEDURE — 63600175 PHARM REV CODE 636 W HCPCS: Performed by: STUDENT IN AN ORGANIZED HEALTH CARE EDUCATION/TRAINING PROGRAM

## 2025-01-28 PROCEDURE — 25500020 PHARM REV CODE 255: Performed by: EMERGENCY MEDICINE

## 2025-01-28 PROCEDURE — 84484 ASSAY OF TROPONIN QUANT: CPT | Performed by: EMERGENCY MEDICINE

## 2025-01-28 PROCEDURE — 99223 1ST HOSP IP/OBS HIGH 75: CPT | Mod: ,,, | Performed by: SURGERY

## 2025-01-28 RX ORDER — ONDANSETRON HYDROCHLORIDE 2 MG/ML
4 INJECTION, SOLUTION INTRAVENOUS
Status: COMPLETED | OUTPATIENT
Start: 2025-01-28 | End: 2025-01-28

## 2025-01-28 RX ORDER — ALBUTEROL SULFATE 90 UG/1
1 INHALANT RESPIRATORY (INHALATION) EVERY 6 HOURS PRN
Status: DISCONTINUED | OUTPATIENT
Start: 2025-01-28 | End: 2025-01-29 | Stop reason: HOSPADM

## 2025-01-28 RX ORDER — BUPROPION HYDROCHLORIDE 150 MG/1
150 TABLET, EXTENDED RELEASE ORAL DAILY
Status: DISCONTINUED | OUTPATIENT
Start: 2025-01-28 | End: 2025-01-29 | Stop reason: HOSPADM

## 2025-01-28 RX ORDER — SODIUM CHLORIDE 0.9 % (FLUSH) 0.9 %
10 SYRINGE (ML) INJECTION
Status: DISCONTINUED | OUTPATIENT
Start: 2025-01-28 | End: 2025-01-29 | Stop reason: HOSPADM

## 2025-01-28 RX ORDER — VALSARTAN AND HYDROCHLOROTHIAZIDE 320; 12.5 MG/1; MG/1
1 TABLET, FILM COATED ORAL DAILY
COMMUNITY

## 2025-01-28 RX ORDER — INDOCYANINE GREEN AND WATER 25 MG
2.5 KIT INJECTION
Status: COMPLETED | OUTPATIENT
Start: 2025-01-29 | End: 2025-01-29

## 2025-01-28 RX ORDER — OXYCODONE HYDROCHLORIDE 5 MG/1
10 TABLET ORAL EVERY 4 HOURS PRN
Status: DISCONTINUED | OUTPATIENT
Start: 2025-01-28 | End: 2025-01-29

## 2025-01-28 RX ORDER — ACETAMINOPHEN 325 MG/1
650 TABLET ORAL EVERY 8 HOURS PRN
Status: DISCONTINUED | OUTPATIENT
Start: 2025-01-28 | End: 2025-01-29 | Stop reason: HOSPADM

## 2025-01-28 RX ORDER — LIDOCAINE HYDROCHLORIDE 10 MG/ML
1 INJECTION, SOLUTION INFILTRATION; PERINEURAL ONCE AS NEEDED
Status: DISCONTINUED | OUTPATIENT
Start: 2025-01-28 | End: 2025-01-29 | Stop reason: HOSPADM

## 2025-01-28 RX ORDER — AMLODIPINE BESYLATE 5 MG/1
5 TABLET ORAL DAILY
Status: DISCONTINUED | OUTPATIENT
Start: 2025-01-28 | End: 2025-01-29 | Stop reason: HOSPADM

## 2025-01-28 RX ORDER — HYDROMORPHONE HYDROCHLORIDE 2 MG/ML
1 INJECTION, SOLUTION INTRAMUSCULAR; INTRAVENOUS; SUBCUTANEOUS
Status: COMPLETED | OUTPATIENT
Start: 2025-01-28 | End: 2025-01-28

## 2025-01-28 RX ORDER — ONDANSETRON 4 MG/1
8 TABLET, ORALLY DISINTEGRATING ORAL EVERY 8 HOURS PRN
Status: DISCONTINUED | OUTPATIENT
Start: 2025-01-28 | End: 2025-01-29 | Stop reason: HOSPADM

## 2025-01-28 RX ORDER — NEBIVOLOL 10 MG/1
10 TABLET ORAL DAILY
COMMUNITY

## 2025-01-28 RX ORDER — TALC
6 POWDER (GRAM) TOPICAL NIGHTLY PRN
Status: DISCONTINUED | OUTPATIENT
Start: 2025-01-28 | End: 2025-01-29 | Stop reason: HOSPADM

## 2025-01-28 RX ORDER — PANTOPRAZOLE SODIUM 40 MG/1
40 TABLET, DELAYED RELEASE ORAL DAILY
Status: DISCONTINUED | OUTPATIENT
Start: 2025-01-28 | End: 2025-01-29 | Stop reason: HOSPADM

## 2025-01-28 RX ORDER — ACETAMINOPHEN 325 MG/1
650 TABLET ORAL EVERY 4 HOURS PRN
Status: DISCONTINUED | OUTPATIENT
Start: 2025-01-28 | End: 2025-01-29 | Stop reason: HOSPADM

## 2025-01-28 RX ORDER — MORPHINE SULFATE 4 MG/ML
4 INJECTION, SOLUTION INTRAMUSCULAR; INTRAVENOUS
Status: COMPLETED | OUTPATIENT
Start: 2025-01-28 | End: 2025-01-28

## 2025-01-28 RX ORDER — VALSARTAN 80 MG/1
160 TABLET ORAL DAILY
Status: DISCONTINUED | OUTPATIENT
Start: 2025-01-28 | End: 2025-01-28

## 2025-01-28 RX ORDER — AMLODIPINE, VALSARTAN AND HYDROCHLOROTHIAZIDE 5; 160; 12.5 MG/1; MG/1; MG/1
1 TABLET ORAL DAILY
Status: DISCONTINUED | OUTPATIENT
Start: 2025-01-28 | End: 2025-01-28 | Stop reason: RX

## 2025-01-28 RX ORDER — OXYCODONE HYDROCHLORIDE 5 MG/1
5 TABLET ORAL EVERY 4 HOURS PRN
Status: DISCONTINUED | OUTPATIENT
Start: 2025-01-28 | End: 2025-01-29

## 2025-01-28 RX ORDER — HYDROCHLOROTHIAZIDE 12.5 MG/1
12.5 TABLET ORAL DAILY
Status: DISCONTINUED | OUTPATIENT
Start: 2025-01-28 | End: 2025-01-29 | Stop reason: HOSPADM

## 2025-01-28 RX ORDER — DIATRIZOATE MEGLUMINE AND DIATRIZOATE SODIUM 660; 100 MG/ML; MG/ML
30 SOLUTION ORAL; RECTAL
Status: COMPLETED | OUTPATIENT
Start: 2025-01-28 | End: 2025-01-28

## 2025-01-28 RX ORDER — ENOXAPARIN SODIUM 100 MG/ML
100 INJECTION SUBCUTANEOUS ONCE
Status: COMPLETED | OUTPATIENT
Start: 2025-01-28 | End: 2025-01-28

## 2025-01-28 RX ORDER — IBUPROFEN 200 MG
1 TABLET ORAL DAILY
Status: DISCONTINUED | OUTPATIENT
Start: 2025-01-29 | End: 2025-01-29 | Stop reason: HOSPADM

## 2025-01-28 RX ORDER — FAMOTIDINE 10 MG/ML
20 INJECTION INTRAVENOUS
Status: COMPLETED | OUTPATIENT
Start: 2025-01-28 | End: 2025-01-28

## 2025-01-28 RX ORDER — CITALOPRAM 10 MG/1
10 TABLET ORAL DAILY
Status: DISCONTINUED | OUTPATIENT
Start: 2025-01-28 | End: 2025-01-29 | Stop reason: HOSPADM

## 2025-01-28 RX ADMIN — IOHEXOL 100 ML: 350 INJECTION, SOLUTION INTRAVENOUS at 09:01

## 2025-01-28 RX ADMIN — DIATRIZOATE MEGLUMINE AND DIATRIZOATE SODIUM 30 ML: 660; 100 LIQUID ORAL; RECTAL at 09:01

## 2025-01-28 RX ADMIN — HYDROCHLOROTHIAZIDE 12.5 MG: 12.5 TABLET ORAL at 04:01

## 2025-01-28 RX ADMIN — ACETAMINOPHEN 650 MG: 325 TABLET, FILM COATED ORAL at 05:01

## 2025-01-28 RX ADMIN — AMLODIPINE BESYLATE 5 MG: 5 TABLET ORAL at 04:01

## 2025-01-28 RX ADMIN — ONDANSETRON 4 MG: 2 INJECTION INTRAMUSCULAR; INTRAVENOUS at 05:01

## 2025-01-28 RX ADMIN — MORPHINE SULFATE 4 MG: 4 INJECTION, SOLUTION INTRAMUSCULAR; INTRAVENOUS at 05:01

## 2025-01-28 RX ADMIN — ENOXAPARIN SODIUM 100 MG: 100 INJECTION SUBCUTANEOUS at 03:01

## 2025-01-28 RX ADMIN — OXYCODONE HYDROCHLORIDE 5 MG: 5 TABLET ORAL at 11:01

## 2025-01-28 RX ADMIN — FAMOTIDINE 20 MG: 10 INJECTION, SOLUTION INTRAVENOUS at 05:01

## 2025-01-28 RX ADMIN — HYDROMORPHONE HYDROCHLORIDE 1 MG: 2 INJECTION INTRAMUSCULAR; INTRAVENOUS; SUBCUTANEOUS at 06:01

## 2025-01-28 NOTE — ED PROVIDER NOTES
Encounter Date: 1/28/2025       History     Chief Complaint   Patient presents with    Chest Pain     Chest pain and epigastric pain since 0415 this morning. States known hx of gallstones. On eliquis for hx of DVTs. Pt states has been having ABD pain for about a week and had outpatient CT scan done.      48 yo M with h/o DVT on eliquis presenting to the ed for evaluation of chest and abdominal pain. He has been having abdominal pain for at least 1 week. It was initially lower abdomen, particularly right lower quadrant, suspected appy but had outpt ct 8 days ago that was negative. He has had known cholelithiasis and sludge for years, has never seen surgery for this. He reports intermittent right upper quadrant pain and epigastric pain for the past couple of years. No fever, chills. He woke up this morning with chest pain in the center of the chest radiating to the right side worse with movement but improved with laying on left side. He reports it feels like his gerd for which he takes omeprazole, but more severe. He endorses needing to belch since onset. Denies shortness of breath or nausea. Normal bm, no change in urination  He reports his most significant pain is epigastric rather than chest    The history is provided by the patient and the spouse. No  was used.     Review of patient's allergies indicates:   Allergen Reactions    Ace inhibitors Swelling    Arb-angiotensin receptor antagonist Swelling    Bactrim [sulfamethoxazole-trimethoprim] Anaphylaxis     ANGIOEDEMA     History reviewed. No pertinent past medical history.  History reviewed. No pertinent surgical history.  No family history on file.  Social History     Tobacco Use    Smoking status: Every Day     Types: Cigarettes    Smokeless tobacco: Never     Review of Systems   Cardiovascular:  Positive for chest pain.   Gastrointestinal:  Positive for abdominal pain.       Physical Exam     Initial Vitals [01/28/25 0513]   BP Pulse Resp  Temp SpO2   (!) 160/111 85 18 97.9 °F (36.6 °C) 97 %      MAP       --         Physical Exam    Nursing note and vitals reviewed.  Constitutional: He appears well-developed and well-nourished. He is not diaphoretic. No distress.   HENT:   Head: Normocephalic and atraumatic.   Nose: Nose normal. Mouth/Throat: Oropharynx is clear and moist.   Eyes: Conjunctivae and EOM are normal. Pupils are equal, round, and reactive to light.   Neck: Trachea normal. Neck supple.   Normal range of motion.  Cardiovascular:  Normal rate, regular rhythm, normal heart sounds and intact distal pulses.     Exam reveals no gallop and no friction rub.       No murmur heard.  Pulmonary/Chest: Breath sounds normal. No respiratory distress. He has no wheezes. He has no rhonchi. He has no rales. He exhibits tenderness.   Abdominal: Abdomen is soft. Bowel sounds are normal. He exhibits no distension and no mass. There is abdominal tenderness.   Epigastric, right upper quadrant and mid abdomen tenderness with voluntary guarding, no rebound There is guarding. There is no rebound.   Musculoskeletal:         General: No tenderness or edema. Normal range of motion.      Cervical back: Normal range of motion and neck supple.      Lumbar back: Normal. No tenderness. Normal range of motion.     Neurological: He is alert and oriented to person, place, and time. He has normal strength. No cranial nerve deficit or sensory deficit.   Skin: Skin is warm and dry. Capillary refill takes less than 2 seconds. No rash and no abscess noted. No erythema. No pallor.   Psychiatric: He has a normal mood and affect. His behavior is normal. Judgment and thought content normal.         ED Course   Procedures  Labs Reviewed   COMPREHENSIVE METABOLIC PANEL - Abnormal       Result Value    Sodium 140      Potassium 4.0      Chloride 105      CO2 22      Glucose 152 (*)     Blood Urea Nitrogen 7.4 (*)     Creatinine 0.74      Calcium 10.0      Protein Total 8.0      Albumin  4.0      Globulin 4.0 (*)     Albumin/Globulin Ratio 1.0 (*)     Bilirubin Total 0.5      ALP 82      ALT 19      AST 17      eGFR >60      Anion Gap 13.0      BUN/Creatinine Ratio 10     CBC WITH DIFFERENTIAL - Abnormal    WBC 6.04      RBC 5.09      Hgb 16.1      Hct 47.4      MCV 93.1      MCH 31.6 (*)     MCHC 34.0      RDW 14.7      Platelet 236      MPV 9.1      Neut % 35.5      Lymph % 48.8      Mono % 12.7      Eos % 2.3      Basophil % 0.5      Imm Grans % 0.2      Neut # 2.14      Lymph # 2.95      Mono # 0.77      Eos # 0.14      Baso # 0.03      Imm Gran # 0.01      NRBC% 0.0     APTT - Normal    PTT 25.4     LIPASE - Normal    Lipase Level 37     TROPONIN I - Normal    Troponin-I <0.010     PROTIME-INR - Normal    PT 12.5      INR 1.0     CANCER ANTIGEN 19-9 - Normal    CA 19-9 7.44      Narrative:     The testing method is a chemiluminescent microparticle immunoassay manufactured by [employer] and performed on the Pegg'd or Innovacene system. Values obtained with different assay manufacturers for methods may be different and cannot be used interchangeably.   CEA - Normal    Carcinoembryonic Antigen <1.73      Narrative:     The testing method is a chemiluminescent microparticle immunoassay manufactured by [employer] and performed on the Pegg'd or Innovacene system. Values obtained with different assay manufacturers for methods may be different and cannot be used interchangeably.   CBC W/ AUTO DIFFERENTIAL    Narrative:     The following orders were created for panel order CBC auto differential.  Procedure                               Abnormality         Status                     ---------                               -----------         ------                     CBC with Differential[[id number]]       Abnormal            Final result                 Please view results for these tests on the individual orders.     EKG Readings: (Independently Interpreted)   Initial Reading:  No STEMI. Rhythm: Normal Sinus Rhythm. Heart Rate: 81. Ectopy: No Ectopy. Conduction: Normal. ST Segments: Normal ST Segments. T Waves: Normal. Axis: Normal.   Performed at 0514       Imaging Results              CT Abdomen Pelvis With IV Contrast Routine Oral Contrast (Final result)  Result time 01/28/25 10:21:03      Final result by Neli Pham MD (01/28/25 10:21:03)                   Impression:      1. Hepatic steatosis  2. Cholelithiasis      Electronically signed by: Neli Pham  Date:    01/28/2025  Time:    10:21               Narrative:    EXAMINATION:  CT ABDOMEN PELVIS WITH IV CONTRAST    CLINICAL HISTORY:  Abdominal abscess/infection suspected; Calculus of gallbladder without cholecystitis without obstruction    TECHNIQUE:  Helically acquired images with axial, sagittal and coronal reformations were obtained from the lung bases to the pubic symphysis after the IV administration of contrast.    Automated tube current modulation, weight-based exposure dosing, and/or iterative reconstruction technique utilized to reach lowest reasonably achievable exposure rate.    DLP: 769 mGy*cm    COMPARISON:  CT abdomen pelvis 01/20/2025    FINDINGS:  HEART: Normal in size. No pericardial effusion.    LUNG BASES: Unchanged right basilar pleuroparenchymal opacity.    LIVER: Hepatic steatosis.    BILIARY: Cholelithiasis.    PANCREAS: No inflammatory change.    SPLEEN: Normal in size    ADRENALS: No mass.    KIDNEYS/URETERS: The kidneys enhance symmetrically.  No hydronephrosis.    GI TRACT/MESENTERY: Positive enteric contrast was administered.  No evidence of bowel obstruction or inflammation. The appendix is normal.    PERITONEUM: No free fluid.No free air.    LYMPH NODES: No enlarged lymph nodes by size criteria.    VASCULATURE: Aortoiliac atherosclerosis.    BLADDER: Normal appearance given degree of distention.    REPRODUCTIVE ORGANS: There are prostate calcifications.    SOFT TISSUES: Scattered dermal  nodules may be related to sebaceous cyst or skin growths.  Correlate with physical exam.  For example in the right inguinal region there is a 1.7 cm nodule. Small fat containing umbilical hernia.    BONES: No acute osseous abnormality.                                       US Abdomen Limited (Gallbladder) (Final result)  Result time 01/28/25 06:18:44      Final result by Herbert Coates MD (01/28/25 06:18:44)                   Impression:      1.  Hepatic steatosis.    2.  Multiple gallstones.  Positive Toledo sign of indeterminate significance without thickened gallbladder wall, pericholecystic fluid or dilatation of the biliary ducts.      Electronically signed by: Herbert Coates  Date:    01/28/2025  Time:    06:18               Narrative:    EXAMINATION:  US ABDOMEN LIMITED    CLINICAL HISTORY:  Epigastric pain    TECHNIQUE:  Multiple real-time transverse and longitudinal sections were performed of the right abdomen by the sonographer. Select images were submitted for review.    COMPARISON:  CT abdomen and pelvis January 20, 2025    FINDINGS:  Hepatic parenchyma is remarkable for diffuse fatty infiltration.  There was no delineation of discrete hepatic cystic or solid mass.  Hepatic maximum diameter of 21.8 cm with sonography is overestimated and on the previous CT abdomen hepatic mid clavicular line maximum diameter is 17.0 cm.  There was unremarkable hepatopedal flow within the portal vein. The pancreas is without a dominant abnormality.    Gallbladder lumen contains multiple shadowing echogenic foci consistent with gallstones.  The largest calculus is 1.3 cm.  Gallbladder wall thickness is within normal limits. Common bile duct caliber of 5.7 mm is within normal limits for the age. Sonographer reported positive Toledo's sign.    Normally located right kidney length measures 11.2 x 5.7 x 5.8 cm. Right renal corticomedullary differentiation is unremarkable. No evidence of hydronephrosis.                                        X-Ray Chest AP Portable (Final result)  Result time 01/28/25 07:30:31      Final result by Herbert Coates MD (01/28/25 07:30:31)                   Impression:      No acute cardiopulmonary process identified.      Electronically signed by: Herbert Coates  Date:    01/28/2025  Time:    07:30               Narrative:    EXAMINATION:  XR CHEST AP PORTABLE    CLINICAL HISTORY:  chest pain;    TECHNIQUE:  One view    COMPARISON:  April 16, 2024.    FINDINGS:  Cardiopericardial silhouette is within normal limits.  Right basilar some atelectatic changes.  No convincing acute dense focal or segmental consolidation, congestive process, pleural effusions or pneumothorax.                                    X-Rays:   Independently Interpreted Readings:   Chest X-Ray: Normal heart size.  No infiltrates.  No acute abnormalities.     Medications   LIDOcaine HCL 10 mg/ml (1%) injection 1 mL (has no administration in time range)   sodium chloride 0.9% flush 10 mL (has no administration in time range)   ondansetron disintegrating tablet 8 mg (has no administration in time range)   melatonin tablet 6 mg (has no administration in time range)   acetaminophen tablet 650 mg (has no administration in time range)   acetaminophen tablet 650 mg (has no administration in time range)   oxyCODONE immediate release tablet 5 mg (has no administration in time range)   oxyCODONE immediate release tablet Tab 10 mg (has no administration in time range)   indocyanine green injection 2.5 mg (has no administration in time range)   morphine injection 4 mg (4 mg Intravenous Given 1/28/25 0559)   ondansetron injection 4 mg (4 mg Intravenous Given 1/28/25 0559)   famotidine (PF) injection 20 mg (20 mg Intravenous Given 1/28/25 0559)   HYDROmorphone (PF) injection 1 mg (1 mg Intravenous Given 1/28/25 0657)   iohexoL (OMNIPAQUE 350) injection 100 mL (100 mLs Intravenous Given 1/28/25 0073)   diatrizoate meglumineand-diatrizoate sodium  (GASTROVIEW) solution 30 mL (30 mLs Oral Given 1/28/25 0929)     Medical Decision Making  Given patient's presentation, differential diagnosis includes but is not limited to gerd, gastritis, pancreatitis, biliary colic, cholecystitis, choledocholithiasis, chest wall pain, acs  To evaluate these  possible etiologies cbc, cmp, coags, trop, lipase, EKG, cxr, abd us were ordered and reviewed  Labs unremarkable  Chest pain not consistent with acs or with etiology like pe--pt reports the symptoms are not similar  His EKG is normal and trop wnl  Surgery admitting    Problems Addressed:  Benign essential HTN: chronic illness or injury  Chest pain: acute illness or injury that poses a threat to life or bodily functions     Details: atypical  Chest wall pain: acute illness or injury that poses a threat to life or bodily functions  Epigastric pain: acute illness or injury that poses a threat to life or bodily functions  Symptomatic cholelithiasis: acute illness or injury that poses a threat to life or bodily functions    Amount and/or Complexity of Data Reviewed  External Data Reviewed: radiology and notes.     Details: Outpt visits for recurrent pe, ct abd pelvis for abd pain  Labs: ordered.  Radiology: ordered and independent interpretation performed.  ECG/medicine tests: ordered and independent interpretation performed.    Risk  OTC drugs.  Prescription drug management.  Parenteral controlled substances.               ED Course as of 01/28/25 1035   Tue Jan 28, 2025   0647 Pain had improved with morphine, returning and severe with movement [BS]   0649 Consult general surgery [BS]   0722 Discussed with surgery resident who will come evaluate patient in the ED [BS]   1017 Awaiting recs from surgery, they have ordered additional testing like ct scan and cea and ca 19-9 [BS]      ED Course User Index  [BS] Kalina Jessica MD                           Clinical Impression:  Final diagnoses:  [R07.9] Chest pain  [R10.13]  Epigastric pain  [I10] Benign essential HTN  [K80.20] Symptomatic cholelithiasis (Primary)  [R07.89] Chest wall pain          ED Disposition Condition    Observation                 Kalina Jessica MD  01/28/25 1035

## 2025-01-28 NOTE — CONSULTS
Acute Care Surgery   Consult    Patient Name: Oracio Gold  YOB: 1975  Date: 01/28/2025 8:23 AM  Date of Admission: 1/28/2025  HD#0  POD#* No surgery found *    PRESENTING HISTORY   Chief Complaint/Reason for Admission: <principal problem not specified>  History source(s): patient  History of Present Illness:  48 y/o M with hx of PE, now on eliquis and GERD. Patient presents to ED today with epigastric and RUQ abdominal pain that radiates to chest and back. Patient reports symptoms started this morning and he also had an episode of emesis. Patient reports that about a week ago he had an episode of RLQ abdominal pain for which a CT scan was obtained, it showed no pathology other than known gallstones. RUQ U/S obtained today shows hepatic steatosis and multiple gallstones without thickened gallbladder or pericholecystic fluid. He denies fever, chills, diarrhea or any other associated symptoms.      Review of Systems:  12 point ROS negative except as stated in HPI    PAST HISTORY:   Past medical history:  History reviewed. No pertinent past medical history.    Past surgical history:  History reviewed. No pertinent surgical history.    Family history:  No family history on file.    Social history:  Social History     Socioeconomic History    Marital status:    Tobacco Use    Smoking status: Every Day     Types: Cigarettes    Smokeless tobacco: Never     Social History     Tobacco Use   Smoking Status Every Day    Types: Cigarettes   Smokeless Tobacco Never      Social History     Substance and Sexual Activity   Alcohol Use None        MEDICATIONS & ALLERGIES:     No current facility-administered medications on file prior to encounter.     Current Outpatient Medications on File Prior to Encounter   Medication Sig    albuterol (PROVENTIL/VENTOLIN HFA) 90 mcg/actuation inhaler Inhale 1-2 puffs into the lungs every 6 (six) hours as needed for Wheezing. Rescue    amLODIPine-valsartan-hcthiazid  "5-160-12.5 mg Tab Take 1 tablet by mouth Daily.    apixaban (ELIQUIS) 5 mg Tab Take 1 tablet (5 mg total) by mouth 2 (two) times daily.    buPROPion (WELLBUTRIN SR) 150 MG TBSR 12 hr tablet Take 150 mg by mouth once daily.    cetirizine (ZYRTEC) 10 MG tablet Take 1 tablet (10 mg total) by mouth once daily. for 7 days    cholestyramine, with sugar, 4 gram Powd Take by mouth.    citalopram (CELEXA) 10 MG tablet Take 10 mg by mouth once daily.    EPINEPHrine (EPIPEN) 0.3 mg/0.3 mL AtIn Inject 0.3 mLs (0.3 mg total) into the muscle as needed (anaphylaxis).    pantoprazole (PROTONIX) 40 MG tablet Take 40 mg by mouth once daily.     Allergies:   Review of patient's allergies indicates:   Allergen Reactions    Ace inhibitors Swelling    Arb-angiotensin receptor antagonist Swelling    Bactrim [sulfamethoxazole-trimethoprim] Anaphylaxis     ANGIOEDEMA     Scheduled Meds:  Continuous Infusions:  PRN Meds:    OBJECTIVE:   Vital Signs:  VITAL SIGNS: 24 HR MIN & MAX LAST   Temp  Min: 97.9 °F (36.6 °C)  Max: 97.9 °F (36.6 °C)  97.9 °F (36.6 °C)   BP  Min: 129/87  Max: 174/89  (!) 148/87    Pulse  Min: 69  Max: 85  70    Resp  Min: 13  Max: 23  (!) 21    SpO2  Min: 93 %  Max: 98 %  (!) 93 %      HT: 5' 9" (175.3 cm)  WT: 102.1 kg (225 lb)  BMI: 33.2     Intake/output:  Intake/Output - Last 3 Shifts       None          No intake or output data in the 24 hours ending 01/28/25 0823      Physical Exam:  General: Well developed, well nourished, no acute distress  HEENT: Normocephalic, PERRL  CV: RR  Resp: NWOB  GI:  Epigastric, RUQ and RLQ tenderness to palpation, soft, no rebound tenderness, voluntary guarding, no inguinal hernias  :  Deferred  MSK: No muscle atrophy, cyanosis, peripheral edema, moving all extremities spontaneously  Skin/wounds:  Lower abdominal rash   Neuro:  CNII-XII grossly intact, alert and oriented to person, place, and time    Labs:  Troponin:  Recent Labs     01/28/25  0533   TROPONINI <0.010     CBC:  Recent " "Labs     01/28/25  0533   WBC 6.04   RBC 5.09   HGB 16.1   HCT 47.4      MCV 93.1   MCH 31.6*   MCHC 34.0     CMP:  Recent Labs     01/28/25  0533   CALCIUM 10.0   ALBUMIN 4.0      K 4.0   CO2 22      BUN 7.4*   CREATININE 0.74   ALKPHOS 82   ALT 19   AST 17   BILITOT 0.5     Lactic Acid:  No results for input(s): "LACTATE" in the last 72 hours.  ETOH:  No results for input(s): "ETHANOL" in the last 72 hours.   Urine Drug Screen:  No results for input(s): "COCAINE", "OPIATE", "BARBITURATE", "AMPHETAMINE", "FENTANYL", "CANNABINOIDS", "MDMA" in the last 72 hours.    Invalid input(s): "BENZODIAZEPINE", "PHENCYCLIDINE"   ABG:  No results for input(s): "PH", "PO2", "PCO2", "HCO3", "BE" in the last 168 hours.   I have reviewed all pertinent lab results within the past 24 hours.    Diagnostic Results:  Imaging Results              US Abdomen Limited (Gallbladder) (Final result)  Result time 01/28/25 06:18:44      Final result by Herbert Coates MD (01/28/25 06:18:44)                   Impression:      1.  Hepatic steatosis.    2.  Multiple gallstones.  Positive Toledo sign of indeterminate significance without thickened gallbladder wall, pericholecystic fluid or dilatation of the biliary ducts.      Electronically signed by: Herbert Coates  Date:    01/28/2025  Time:    06:18               Narrative:    EXAMINATION:  US ABDOMEN LIMITED    CLINICAL HISTORY:  Epigastric pain    TECHNIQUE:  Multiple real-time transverse and longitudinal sections were performed of the right abdomen by the sonographer. Select images were submitted for review.    COMPARISON:  CT abdomen and pelvis January 20, 2025    FINDINGS:  Hepatic parenchyma is remarkable for diffuse fatty infiltration.  There was no delineation of discrete hepatic cystic or solid mass.  Hepatic maximum diameter of 21.8 cm with sonography is overestimated and on the previous CT abdomen hepatic mid clavicular line maximum diameter is 17.0 cm.  There was " unremarkable hepatopedal flow within the portal vein. The pancreas is without a dominant abnormality.    Gallbladder lumen contains multiple shadowing echogenic foci consistent with gallstones.  The largest calculus is 1.3 cm.  Gallbladder wall thickness is within normal limits. Common bile duct caliber of 5.7 mm is within normal limits for the age. Sonographer reported positive Toledo's sign.    Normally located right kidney length measures 11.2 x 5.7 x 5.8 cm. Right renal corticomedullary differentiation is unremarkable. No evidence of hydronephrosis.                                       X-Ray Chest AP Portable (Final result)  Result time 01/28/25 07:30:31      Final result by Herbert Coates MD (01/28/25 07:30:31)                   Impression:      No acute cardiopulmonary process identified.      Electronically signed by: Herbert Coates  Date:    01/28/2025  Time:    07:30               Narrative:    EXAMINATION:  XR CHEST AP PORTABLE    CLINICAL HISTORY:  chest pain;    TECHNIQUE:  One view    COMPARISON:  April 16, 2024.    FINDINGS:  Cardiopericardial silhouette is within normal limits.  Right basilar some atelectatic changes.  No convincing acute dense focal or segmental consolidation, congestive process, pleural effusions or pneumothorax.                                     I have reviewed all pertinent imaging results/findings within the past 24 hours.    ASSESSMENT & PLAN:    50 y/o M with hx of DVT and PE that comes Symptomatic cholelithiasis.  - Admit to surgery service  - Clear liquid diet  - NPO midnight  - Possible lap vs robotic cholecystectomy 1/29  - [facility]    Hardeep Alatorre MD  [employer] General Surgery PGY-1

## 2025-01-28 NOTE — NURSING
Admission documentation completed by the admit nurse. Home med rec complete. Pt did elect for meds to bed with [facility]. 4 Eyes and standing weight to be completed by the admitting floor nurse.

## 2025-01-28 NOTE — PLAN OF CARE
Pt is , 2 children, no living will or POA.    01/28/25 1142   Discharge Assessment   Assessment Type Discharge Planning Assessment   Confirmed/corrected address, phone number and insurance Yes   Confirmed Demographics Correct on Facesheet   Source of Information patient   When was your last doctors appointment?   (PCP Jessica Rogel NP at Dr. Rhodes)   Communicated LAYLA with patient/caregiver Date not available/Unable to determine   People in Home spouse;child(bari), dependent;child(bari), adult   Do you expect to return to your current living situation? Yes   Do you have help at home or someone to help you manage your care at home? Yes   Who are your caregiver(s) and their phone number(s)? wife Maria Luisa [phone]   Prior to hospitilization cognitive status: Unable to Assess   Current cognitive status: Alert/Oriented   Walking or Climbing Stairs Difficulty no   Dressing/Bathing Difficulty no   Home Accessibility wheelchair accessible   Home Layout Able to live on 1st floor   Equipment Currently Used at Home none   Readmission within 30 days? No   Patient currently being followed by outpatient case management? No   Do you currently have service(s) that help you manage your care at home? No   Do you take prescription medications? Yes  (Fills with [facility])   Do you have prescription coverage? Yes   Coverage BCBS   Do you have any problems affording any of your prescribed medications? No   Is the patient taking medications as prescribed? yes   Who is going to help you get home at discharge? wife   How do you get to doctors appointments? car, drives self;family or friend will provide   Are you on dialysis? No   Do you take coumadin? No   Discharge Plan A Home with family   DME Needed Upon Discharge  none   Discharge Plan discussed with: Patient   Transition of Care Barriers None   Physical Activity   On average, how many days per week do you engage in moderate to strenuous exercise (like a brisk  walk)? 0 days   On average, how many minutes do you engage in exercise at this level? 0 min   Financial Resource Strain   How hard is it for you to pay for the very basics like food, housing, medical care, and heating? Not very   Housing Stability   In the last 12 months, was there a time when you were not able to pay the mortgage or rent on time? N   At any time in the past 12 months, were you homeless or living in a shelter (including now)? N   Transportation Needs   Has the lack of transportation kept you from medical appointments, meetings, work or from getting things needed for daily living? No   Food Insecurity   Within the past 12 months, you worried that your food would run out before you got the money to buy more. Never true   Within the past 12 months, the food you bought just didn't last and you didn't have money to get more. Never true   Stress   Do you feel stress - tense, restless, nervous, or anxious, or unable to sleep at night because your mind is troubled all the time - these days? Not at all  (on meds)   Social Isolation   How often do you feel lonely or isolated from those around you?  Never  (on meds)   Alcohol Use   Q1: How often do you have a drink containing alcohol? 4 or more ti   Q2: How many drinks containing alcohol do you have on a typical day when you are drinking? 5 or 6   Q3: How often do you have six or more drinks on one occasion? Daily   Health Literacy   How often do you need to have someone help you when you read instructions, pamphlets, or other written material from your doctor or pharmacy? Rarely   OTHER   Name(s) of People in Home Maria Luisa wife, 1 adult and 1 minor daughter

## 2025-01-29 ENCOUNTER — ANESTHESIA (OUTPATIENT)
Dept: SURGERY | Facility: HOSPITAL | Age: 50
End: 2025-01-29
Payer: COMMERCIAL

## 2025-01-29 VITALS
RESPIRATION RATE: 20 BRPM | SYSTOLIC BLOOD PRESSURE: 116 MMHG | BODY MASS INDEX: 32.46 KG/M2 | WEIGHT: 219.13 LBS | TEMPERATURE: 99 F | HEIGHT: 69 IN | DIASTOLIC BLOOD PRESSURE: 74 MMHG | OXYGEN SATURATION: 91 % | HEART RATE: 63 BPM

## 2025-01-29 PROBLEM — K80.20 SYMPTOMATIC CHOLELITHIASIS: Status: ACTIVE | Noted: 2025-01-29

## 2025-01-29 LAB
ALBUMIN SERPL-MCNC: 3.7 G/DL (ref 3.5–5)
ALBUMIN/GLOB SERPL: 1.1 RATIO (ref 1.1–2)
ALP SERPL-CCNC: 78 UNIT/L (ref 40–150)
ALT SERPL-CCNC: 15 UNIT/L (ref 0–55)
ANION GAP SERPL CALC-SCNC: 10 MEQ/L
AST SERPL-CCNC: 17 UNIT/L (ref 5–34)
BASOPHILS # BLD AUTO: 0.02 X10(3)/MCL
BASOPHILS NFR BLD AUTO: 0.4 %
BILIRUB SERPL-MCNC: 0.6 MG/DL
BUN SERPL-MCNC: 5.2 MG/DL (ref 8.9–20.6)
CALCIUM SERPL-MCNC: 9.4 MG/DL (ref 8.4–10.2)
CHLORIDE SERPL-SCNC: 105 MMOL/L (ref 98–107)
CO2 SERPL-SCNC: 24 MMOL/L (ref 22–29)
CREAT SERPL-MCNC: 0.7 MG/DL (ref 0.72–1.25)
CREAT/UREA NIT SERPL: 7
EOSINOPHIL # BLD AUTO: 0.11 X10(3)/MCL (ref 0–0.9)
EOSINOPHIL NFR BLD AUTO: 2.1 %
ERYTHROCYTE [DISTWIDTH] IN BLOOD BY AUTOMATED COUNT: 14.8 % (ref 11.5–17)
GFR SERPLBLD CREATININE-BSD FMLA CKD-EPI: >60 ML/MIN/1.73/M2
GLOBULIN SER-MCNC: 3.3 GM/DL (ref 2.4–3.5)
GLUCOSE SERPL-MCNC: 111 MG/DL (ref 74–100)
HCT VFR BLD AUTO: 44.4 % (ref 42–52)
HGB BLD-MCNC: 14.6 G/DL (ref 14–18)
IMM GRANULOCYTES # BLD AUTO: 0.02 X10(3)/MCL (ref 0–0.04)
IMM GRANULOCYTES NFR BLD AUTO: 0.4 %
LYMPHOCYTES # BLD AUTO: 2.5 X10(3)/MCL (ref 0.6–4.6)
LYMPHOCYTES NFR BLD AUTO: 47.6 %
MAGNESIUM SERPL-MCNC: 2 MG/DL (ref 1.6–2.6)
MCH RBC QN AUTO: 31.6 PG (ref 27–31)
MCHC RBC AUTO-ENTMCNC: 32.9 G/DL (ref 33–36)
MCV RBC AUTO: 96.1 FL (ref 80–94)
MONOCYTES # BLD AUTO: 0.74 X10(3)/MCL (ref 0.1–1.3)
MONOCYTES NFR BLD AUTO: 14.1 %
NEUTROPHILS # BLD AUTO: 1.86 X10(3)/MCL (ref 2.1–9.2)
NEUTROPHILS NFR BLD AUTO: 35.4 %
NRBC BLD AUTO-RTO: 0 %
PHOSPHATE SERPL-MCNC: 2.6 MG/DL (ref 2.3–4.7)
PLATELET # BLD AUTO: 214 X10(3)/MCL (ref 130–400)
PMV BLD AUTO: 9.7 FL (ref 7.4–10.4)
POTASSIUM SERPL-SCNC: 3.9 MMOL/L (ref 3.5–5.1)
PROT SERPL-MCNC: 7 GM/DL (ref 6.4–8.3)
RBC # BLD AUTO: 4.62 X10(6)/MCL (ref 4.7–6.1)
SODIUM SERPL-SCNC: 139 MMOL/L (ref 136–145)
WBC # BLD AUTO: 5.25 X10(3)/MCL (ref 4.5–11.5)

## 2025-01-29 PROCEDURE — 99233 SBSQ HOSP IP/OBS HIGH 50: CPT | Mod: ,,, | Performed by: SURGERY

## 2025-01-29 PROCEDURE — 83735 ASSAY OF MAGNESIUM: CPT | Performed by: STUDENT IN AN ORGANIZED HEALTH CARE EDUCATION/TRAINING PROGRAM

## 2025-01-29 PROCEDURE — 27201423 OPTIME MED/SURG SUP & DEVICES STERILE SUPPLY: Performed by: SURGERY

## 2025-01-29 PROCEDURE — 85025 COMPLETE CBC W/AUTO DIFF WBC: CPT | Performed by: STUDENT IN AN ORGANIZED HEALTH CARE EDUCATION/TRAINING PROGRAM

## 2025-01-29 PROCEDURE — 36415 COLL VENOUS BLD VENIPUNCTURE: CPT | Performed by: STUDENT IN AN ORGANIZED HEALTH CARE EDUCATION/TRAINING PROGRAM

## 2025-01-29 PROCEDURE — 25000003 PHARM REV CODE 250

## 2025-01-29 PROCEDURE — 80053 COMPREHEN METABOLIC PANEL: CPT | Performed by: STUDENT IN AN ORGANIZED HEALTH CARE EDUCATION/TRAINING PROGRAM

## 2025-01-29 PROCEDURE — 25000003 PHARM REV CODE 250: Performed by: STUDENT IN AN ORGANIZED HEALTH CARE EDUCATION/TRAINING PROGRAM

## 2025-01-29 PROCEDURE — 63600175 PHARM REV CODE 636 W HCPCS: Performed by: SURGERY

## 2025-01-29 PROCEDURE — 25000003 PHARM REV CODE 250: Performed by: SURGERY

## 2025-01-29 PROCEDURE — G0378 HOSPITAL OBSERVATION PER HR: HCPCS

## 2025-01-29 PROCEDURE — 37000008 HC ANESTHESIA 1ST 15 MINUTES: Performed by: SURGERY

## 2025-01-29 PROCEDURE — 63600175 PHARM REV CODE 636 W HCPCS: Mod: TB,JZ

## 2025-01-29 PROCEDURE — 84100 ASSAY OF PHOSPHORUS: CPT | Performed by: STUDENT IN AN ORGANIZED HEALTH CARE EDUCATION/TRAINING PROGRAM

## 2025-01-29 PROCEDURE — 36000708 HC OR TIME LEV III 1ST 15 MIN: Performed by: SURGERY

## 2025-01-29 PROCEDURE — 63600175 PHARM REV CODE 636 W HCPCS: Performed by: NURSE ANESTHETIST, CERTIFIED REGISTERED

## 2025-01-29 PROCEDURE — 37000009 HC ANESTHESIA EA ADD 15 MINS: Performed by: SURGERY

## 2025-01-29 PROCEDURE — 36000709 HC OR TIME LEV III EA ADD 15 MIN: Performed by: SURGERY

## 2025-01-29 PROCEDURE — 25000003 PHARM REV CODE 250: Performed by: NURSE ANESTHETIST, CERTIFIED REGISTERED

## 2025-01-29 PROCEDURE — 71000033 HC RECOVERY, INTIAL HOUR: Performed by: SURGERY

## 2025-01-29 PROCEDURE — 47562 LAPAROSCOPIC CHOLECYSTECTOMY: CPT | Mod: ,,, | Performed by: SURGERY

## 2025-01-29 RX ORDER — DIPHENHYDRAMINE HYDROCHLORIDE 50 MG/ML
25 INJECTION INTRAMUSCULAR; INTRAVENOUS EVERY 6 HOURS PRN
Status: DISCONTINUED | OUTPATIENT
Start: 2025-01-29 | End: 2025-01-29 | Stop reason: HOSPADM

## 2025-01-29 RX ORDER — MIDAZOLAM HYDROCHLORIDE 2 MG/2ML
2 INJECTION, SOLUTION INTRAMUSCULAR; INTRAVENOUS ONCE AS NEEDED
OUTPATIENT
Start: 2025-01-29 | End: 2036-06-26

## 2025-01-29 RX ORDER — LIDOCAINE HYDROCHLORIDE 20 MG/ML
INJECTION, SOLUTION EPIDURAL; INFILTRATION; INTRACAUDAL; PERINEURAL
Status: DISCONTINUED | OUTPATIENT
Start: 2025-01-29 | End: 2025-01-29

## 2025-01-29 RX ORDER — PROPOFOL 10 MG/ML
VIAL (ML) INTRAVENOUS
Status: DISCONTINUED | OUTPATIENT
Start: 2025-01-29 | End: 2025-01-29

## 2025-01-29 RX ORDER — ONDANSETRON HYDROCHLORIDE 2 MG/ML
4 INJECTION, SOLUTION INTRAVENOUS DAILY PRN
Status: DISCONTINUED | OUTPATIENT
Start: 2025-01-29 | End: 2025-01-29 | Stop reason: HOSPADM

## 2025-01-29 RX ORDER — OXYCODONE HYDROCHLORIDE 5 MG/1
5 TABLET ORAL EVERY 4 HOURS PRN
Status: DISCONTINUED | OUTPATIENT
Start: 2025-01-29 | End: 2025-01-29 | Stop reason: HOSPADM

## 2025-01-29 RX ORDER — GLYCOPYRROLATE 0.2 MG/ML
INJECTION INTRAMUSCULAR; INTRAVENOUS
Status: DISCONTINUED | OUTPATIENT
Start: 2025-01-29 | End: 2025-01-29

## 2025-01-29 RX ORDER — METOCLOPRAMIDE HYDROCHLORIDE 5 MG/ML
10 INJECTION INTRAMUSCULAR; INTRAVENOUS EVERY 10 MIN PRN
Status: DISCONTINUED | OUTPATIENT
Start: 2025-01-29 | End: 2025-01-29 | Stop reason: HOSPADM

## 2025-01-29 RX ORDER — FENTANYL CITRATE 50 UG/ML
INJECTION, SOLUTION INTRAMUSCULAR; INTRAVENOUS
Status: DISCONTINUED | OUTPATIENT
Start: 2025-01-29 | End: 2025-01-29

## 2025-01-29 RX ORDER — KETOROLAC TROMETHAMINE 30 MG/ML
INJECTION, SOLUTION INTRAMUSCULAR; INTRAVENOUS
Status: DISCONTINUED | OUTPATIENT
Start: 2025-01-29 | End: 2025-01-29

## 2025-01-29 RX ORDER — LIDOCAINE HYDROCHLORIDE 10 MG/ML
1 INJECTION, SOLUTION EPIDURAL; INFILTRATION; INTRACAUDAL; PERINEURAL ONCE
OUTPATIENT
Start: 2025-01-29 | End: 2025-01-29

## 2025-01-29 RX ORDER — SODIUM CHLORIDE, SODIUM GLUCONATE, SODIUM ACETATE, POTASSIUM CHLORIDE AND MAGNESIUM CHLORIDE 30; 37; 368; 526; 502 MG/100ML; MG/100ML; MG/100ML; MG/100ML; MG/100ML
INJECTION, SOLUTION INTRAVENOUS CONTINUOUS
OUTPATIENT
Start: 2025-01-29 | End: 2025-02-28

## 2025-01-29 RX ORDER — DEXAMETHASONE SODIUM PHOSPHATE 4 MG/ML
INJECTION, SOLUTION INTRA-ARTICULAR; INTRALESIONAL; INTRAMUSCULAR; INTRAVENOUS; SOFT TISSUE
Status: DISCONTINUED | OUTPATIENT
Start: 2025-01-29 | End: 2025-01-29

## 2025-01-29 RX ORDER — ROCURONIUM BROMIDE 10 MG/ML
INJECTION, SOLUTION INTRAVENOUS
Status: DISCONTINUED | OUTPATIENT
Start: 2025-01-29 | End: 2025-01-29

## 2025-01-29 RX ORDER — BUPIVACAINE HYDROCHLORIDE AND EPINEPHRINE 2.5; 5 MG/ML; UG/ML
INJECTION, SOLUTION EPIDURAL; INFILTRATION; INTRACAUDAL; PERINEURAL
Status: DISCONTINUED | OUTPATIENT
Start: 2025-01-29 | End: 2025-01-29 | Stop reason: HOSPADM

## 2025-01-29 RX ORDER — MIDAZOLAM HYDROCHLORIDE 1 MG/ML
INJECTION INTRAMUSCULAR; INTRAVENOUS
Status: DISCONTINUED | OUTPATIENT
Start: 2025-01-29 | End: 2025-01-29

## 2025-01-29 RX ORDER — HYDROMORPHONE HYDROCHLORIDE 2 MG/ML
0.4 INJECTION, SOLUTION INTRAMUSCULAR; INTRAVENOUS; SUBCUTANEOUS EVERY 5 MIN PRN
Status: DISCONTINUED | OUTPATIENT
Start: 2025-01-29 | End: 2025-01-29 | Stop reason: HOSPADM

## 2025-01-29 RX ORDER — ONDANSETRON HYDROCHLORIDE 2 MG/ML
INJECTION, SOLUTION INTRAMUSCULAR; INTRAVENOUS
Status: DISCONTINUED | OUTPATIENT
Start: 2025-01-29 | End: 2025-01-29

## 2025-01-29 RX ORDER — ONDANSETRON 4 MG/1
4 TABLET, ORALLY DISINTEGRATING ORAL ONCE
OUTPATIENT
Start: 2025-01-29 | End: 2025-01-29

## 2025-01-29 RX ORDER — OXYCODONE HYDROCHLORIDE 5 MG/1
5 TABLET ORAL EVERY 8 HOURS PRN
Qty: 7 TABLET | Refills: 0 | Status: SHIPPED | OUTPATIENT
Start: 2025-01-29 | End: 2025-01-29

## 2025-01-29 RX ORDER — CEFAZOLIN 2 G/1
2 INJECTION, POWDER, FOR SOLUTION INTRAMUSCULAR; INTRAVENOUS ONCE
Status: COMPLETED | OUTPATIENT
Start: 2025-01-29 | End: 2025-01-29

## 2025-01-29 RX ORDER — OXYCODONE HYDROCHLORIDE 5 MG/1
5 TABLET ORAL EVERY 8 HOURS PRN
Qty: 7 TABLET | Refills: 0 | Status: SHIPPED | OUTPATIENT
Start: 2025-01-29 | End: 2025-01-30 | Stop reason: SDUPTHER

## 2025-01-29 RX ADMIN — GLYCOPYRROLATE 0.2 MG: 0.2 INJECTION INTRAMUSCULAR; INTRAVENOUS at 07:01

## 2025-01-29 RX ADMIN — OXYCODONE HYDROCHLORIDE 5 MG: 5 TABLET ORAL at 12:01

## 2025-01-29 RX ADMIN — KETOROLAC TROMETHAMINE 30 MG: 30 INJECTION, SOLUTION INTRAMUSCULAR; INTRAVENOUS at 08:01

## 2025-01-29 RX ADMIN — FENTANYL CITRATE 50 MCG: 50 INJECTION, SOLUTION INTRAMUSCULAR; INTRAVENOUS at 08:01

## 2025-01-29 RX ADMIN — FENTANYL CITRATE 100 MCG: 50 INJECTION, SOLUTION INTRAMUSCULAR; INTRAVENOUS at 07:01

## 2025-01-29 RX ADMIN — ONDANSETRON 4 MG: 2 INJECTION INTRAMUSCULAR; INTRAVENOUS at 07:01

## 2025-01-29 RX ADMIN — CEFAZOLIN 2 G: 2 INJECTION, POWDER, FOR SOLUTION INTRAMUSCULAR; INTRAVENOUS at 07:01

## 2025-01-29 RX ADMIN — DEXAMETHASONE SODIUM PHOSPHATE 4 MG: 4 INJECTION, SOLUTION INTRA-ARTICULAR; INTRALESIONAL; INTRAMUSCULAR; INTRAVENOUS; SOFT TISSUE at 07:01

## 2025-01-29 RX ADMIN — HYDROCHLOROTHIAZIDE 12.5 MG: 12.5 TABLET ORAL at 05:01

## 2025-01-29 RX ADMIN — PROPOFOL 200 MG: 10 INJECTION, EMULSION INTRAVENOUS at 07:01

## 2025-01-29 RX ADMIN — INDOCYANINE GREEN AND WATER 2.5 MG: KIT at 07:01

## 2025-01-29 RX ADMIN — LIDOCAINE HYDROCHLORIDE 50 MG: 20 INJECTION, SOLUTION INTRAVENOUS at 07:01

## 2025-01-29 RX ADMIN — MIDAZOLAM HYDROCHLORIDE 2 MG: 1 INJECTION, SOLUTION INTRAMUSCULAR; INTRAVENOUS at 07:01

## 2025-01-29 RX ADMIN — ROCURONIUM BROMIDE 50 MG: 10 SOLUTION INTRAVENOUS at 07:01

## 2025-01-29 RX ADMIN — SUGAMMADEX 200 MG: 100 INJECTION, SOLUTION INTRAVENOUS at 08:01

## 2025-01-29 RX ADMIN — AMLODIPINE BESYLATE 5 MG: 5 TABLET ORAL at 05:01

## 2025-01-29 RX ADMIN — PANTOPRAZOLE SODIUM 40 MG: 40 TABLET, DELAYED RELEASE ORAL at 12:01

## 2025-01-29 RX ADMIN — CITALOPRAM HYDROBROMIDE 10 MG: 10 TABLET ORAL at 12:01

## 2025-01-29 RX ADMIN — SODIUM CHLORIDE, SODIUM GLUCONATE, SODIUM ACETATE, POTASSIUM CHLORIDE AND MAGNESIUM CHLORIDE: 526; 502; 368; 37; 30 INJECTION, SOLUTION INTRAVENOUS at 07:01

## 2025-01-29 NOTE — TRANSFER OF CARE
"Anesthesia Transfer of Care Note    Patient: Oracio Gold    Procedure(s) Performed: Procedure(s) (LRB):  CHOLECYSTECTOMY, LAPAROSCOPIC (N/A)    Patient location: PACU    Anesthesia Type: general    Transport from OR: Transported from OR on room air with adequate spontaneous ventilation    Post pain: adequate analgesia    Post assessment: no apparent anesthetic complications    Post vital signs: stable    Level of consciousness: awake    Nausea/Vomiting: no nausea/vomiting    Complications: none    Transfer of care protocol was followed      Last vitals: Visit Vitals  BP (!) 143/84 (BP Location: Left arm, Patient Position: Sitting)   Pulse (!) 50   Temp 36.4 °C (97.5 °F) (Oral)   Resp 17   Ht 5' 9" (1.753 m)   Wt 99.4 kg (219 lb 1.6 oz)   SpO2 (!) 91%   BMI 32.36 kg/m²     "

## 2025-01-29 NOTE — PLAN OF CARE
Problem: Adult Inpatient Plan of Care  Goal: Plan of Care Review  Outcome: Progressing  Goal: Patient-Specific Goal (Individualized)  Outcome: Progressing  Goal: Absence of Hospital-Acquired Illness or Injury  Outcome: Progressing  Goal: Optimal Comfort and Wellbeing  Outcome: Progressing  Goal: Readiness for Transition of Care  Outcome: Progressing     Problem: Pneumonia  Goal: Fluid Balance  Outcome: Progressing     Problem: Pneumonia  Goal: Fluid Balance  Outcome: Progressing     Problem: Pneumonia  Goal: Fluid Balance  Outcome: Progressing  Goal: Resolution of Infection Signs and Symptoms  Outcome: Progressing  Goal: Effective Oxygenation and Ventilation  Outcome: Progressing     Problem: Fall Injury Risk  Goal: Absence of Fall and Fall-Related Injury  Outcome: Progressing

## 2025-01-29 NOTE — PROGRESS NOTES
Acute Care Surgery   Progress Note  Admit Date: 1/28/2025  HD#0  POD#Day of Surgery    Subjective:   Interval history:  NAEON  Persistent RUQ pain  Minimal nausea, no vomiting  1BM  NPO since midnight for OR today    Home Meds:  Current Outpatient Medications   Medication Instructions    albuterol (PROVENTIL/VENTOLIN HFA) 90 mcg/actuation inhaler 1-2 puffs, Inhalation, Every 6 hours PRN, Rescue    apixaban (ELIQUIS) 5 mg, Oral, 2 times daily    cholestyramine, with sugar, 4 gram Powd Take by mouth.    citalopram (CELEXA) 10 mg, Daily    EPINEPHrine (EPIPEN) 0.3 mg, Intramuscular, As needed (PRN)    nebivoloL (BYSTOLIC) 10 mg, Daily    pantoprazole (PROTONIX) 40 mg, Daily    valsartan-hydrochlorothiazide (DIOVAN-HCT) 320-12.5 mg per tablet 1 tablet, Daily      Scheduled Meds:   amLODIPine  5 mg Oral Daily    And    hydroCHLOROthiazide  12.5 mg Oral Daily    buPROPion  150 mg Oral Daily    citalopram  10 mg Oral Daily    nicotine  1 patch Transdermal Daily    pantoprazole  40 mg Oral Daily     Continuous Infusions:  PRN Meds:  Current Facility-Administered Medications:     acetaminophen, 650 mg, Oral, Q8H PRN    acetaminophen, 650 mg, Oral, Q4H PRN    albuterol, 1 puff, Inhalation, Q6H PRN    BUPivacaine-EPINEPHrine (PF) 0.25%-1:200,000, , , PRN    diphenhydrAMINE, 25 mg, Intravenous, Q6H PRN    HYDROmorphone, 0.4 mg, Intravenous, Q5 Min PRN    LIDOcaine HCL 10 mg/ml (1%), 1 mL, Intradermal, Once PRN    melatonin, 6 mg, Oral, Nightly PRN    metoclopramide, 10 mg, Intravenous, Q10 Min PRN    ondansetron, 8 mg, Oral, Q8H PRN    ondansetron, 4 mg, Intravenous, Daily PRN    oxyCODONE, 5 mg, Oral, Q4H PRN    sodium chloride 0.9%, 10 mL, Intravenous, PRN     Objective:     VITAL SIGNS: 24 HR MIN & MAX LAST   Temp  Min: 97.4 °F (36.3 °C)  Max: 98.6 °F (37 °C)  97.5 °F (36.4 °C)   BP  Min: 117/71  Max: 155/97  (!) 143/84    Pulse  Min: 50  Max: 67  (!) 50    Resp  Min: 17  Max: 21  17    SpO2  Min: 91 %  Max: 97 %  (!) 91  "%      HT: 5' 9" (175.3 cm)  WT: 99.4 kg (219 lb 1.6 oz)  BMI: 32.3     Intake/output:  Intake/Output - Last 3 Shifts         01/27 0700 01/28 0659 01/28 0700 01/29 0659 01/29 0700 01/30 0659    P.O.  120     IV Piggyback   1000    Total Intake(mL/kg)  120 (1.2) 1000 (10.1)    Net  +120 +1000                   Intake/Output Summary (Last 24 hours) at 1/29/2025 0849  Last data filed at 1/29/2025 0834  Gross per 24 hour   Intake 1120 ml   Output --   Net 1120 ml           Lines/drains/airway:       Peripheral IV - Single Lumen 01/28/25 0530 20 G No Anterior;Proximal;Right Forearm (Active)   Site Assessment Clean;Dry;Intact;No redness;No swelling;No warmth 01/29/25 0629   Line Securement Device Secured with sutureless device 01/29/25 0400   Extremity Assessment Distal to IV No abnormal discoloration;No redness;No swelling;No warmth 01/29/25 0629   Line Status Flushed;Saline locked 01/29/25 0629   Dressing Status Clean;Dry;Intact 01/29/25 0629   Dressing Intervention Integrity maintained 01/29/25 0629   Reason Not Rotated Not due 01/29/25 0629   Number of days: 1       Physical examination:  GGeneral: Well developed, well nourished, no acute distress  HEENT: Normocephalic, PERRL  CV: RR  Resp: NWOB  GI:  Epigastric, RUQ and RLQ tenderness to palpation, soft, no rebound tenderness, voluntary guarding, no inguinal hernias  :  Deferred  MSK: No muscle atrophy, cyanosis, peripheral edema, moving all extremities spontaneously  Skin/wounds:  Lower abdominal rash   Neuro:  CNII-XII grossly intact, alert and oriented to person, place, and time:    Labs:  Renal:  Recent Labs     01/28/25 0533 01/29/25  0309   BUN 7.4* 5.2*   CREATININE 0.74 0.70*     No results for input(s): "LACTIC" in the last 72 hours.  FENGI:  Recent Labs     01/28/25  0533 01/29/25  0309    139   K 4.0 3.9    105   CO2 22 24   CALCIUM 10.0 9.4   MG  --  2.00   PHOS  --  2.6   ALBUMIN 4.0 3.7   BILITOT 0.5 0.6   AST 17 17   ALKPHOS 82 78 " "  ALT 19 15     Heme:  Recent Labs     01/28/25  0533 01/29/25  0309   HGB 16.1 14.6   HCT 47.4 44.4    214   INR 1.0  --      ID:  Recent Labs     01/28/25  0533 01/29/25  0309   WBC 6.04 5.25     CBG:  Recent Labs     01/28/25  0533 01/29/25  0309   GLUCOSE 152* 111*      Cardiovascular:  Recent Labs   Lab 01/28/25 0533   TROPONINI <0.010     ABG:  No results for input(s): "PH", "PO2", "PCO2", "HCO3", "BE" in the last 168 hours.   I have reviewed all pertinent lab results within the past 24 hours.    Imaging:  CT Abdomen Pelvis With IV Contrast Routine Oral Contrast   Final Result      1. Hepatic steatosis   2. Cholelithiasis         Electronically signed by: Neli Pham   Date:    01/28/2025   Time:    10:21      US Abdomen Limited (Gallbladder)   Final Result      1.  Hepatic steatosis.      2.  Multiple gallstones.  Positive [place] sign of indeterminate significance without thickened gallbladder wall, pericholecystic fluid or dilatation of the biliary ducts.         Electronically signed by: Herbert Coates   Date:    01/28/2025   Time:    06:18      X-Ray Chest AP Portable   Final Result      No acute cardiopulmonary process identified.         Electronically signed by: Herbert Coates   Date:    01/28/2025   Time:    07:30         I have reviewed all pertinent imaging results/findings within the past 24 hours.    Micro/Path/Other:  Microbiology Results (last 7 days)       ** No results found for the last 168 hours. **           Pathology Results  (Last 7 days)      None             Assessment & Plan:   50 y/o M with hx of DVT and PE that comes Symptomatic cholelithiasis. Plan for OR today for cholecystectomy.    -MMPC  -OR today  -advance to regular diet post op  -Determine timing for restarting tommy Elmore MD  [facility]      "

## 2025-01-29 NOTE — OP NOTE
[facility] - Periop Services  General Surgery  Operative Note    SUMMARY     Date of Procedure: 1/29/2025     Procedure: Procedure(s) (LRB):  CHOLECYSTECTOMY, LAPAROSCOPIC (N/A)       Surgeons and Role:     * Ankit Mir MD - Primary     * Fallon Elmore MD - Resident - Assisting     * Hardeep Reyna MD - Resident - Assisting        Pre-Operative Diagnosis: Symptomatic cholelithiasis [K80.20]    Post-Operative Diagnosis: Post-Op Diagnosis Codes:     * Symptomatic cholelithiasis [K80.20]    Anesthesia: General    Operative Findings (including complications, if any): cholecystitis      Technique:    Once informed consents were obtained, patient was taken to the operating room and placed supine on the operating table.  After general endotracheal anesthesia was induced, the abdomen was prepped and draped in the standard sterile surgical fashion.      A supraumbilical incision was made with the scalpel and a 11mm optiview trochar was used to enter the abdomen under direct visualization. Pneumoperitoneum was then established with insufflation. The abdomen was then examined and there was noted to be no injury from entry. Two additional 5 millimeter ports were placed in the right upper quadrant followed by 12 millimeter trocar placed subxiphoid, all under direct visualization and after administration of local anesthetic.  The gallbladder was then visualized and retracted both superiorly and laterally.  It appeared to be edematous and inflamed. A combination of blunt dissection and cautery were used to dissect out the cystic artery, cystic duct, and lower third of the gallbladder. Once the critical view was obtained, the artery and duct were clipped twice proximally and once distally before being transected sharply with scissors.  The gallbladder was then removed from the liver bed using the hook cautery for dissection and hemostasis. The gallbladder was extracted from the abdomen through  the subxiphoid port site using an endocatch bag.  Attention was then redirected to the gallbladder fossa, and good hemostasis was noted.  The 12mm port fascia was closed.   Skin was then closed with 4-0 monocryl suture and dermabond was placed.  At the end of the case, the patient was awakened from anesthesia, extubated, and brought to the PACU in stable condition.      EBL: 5ml    Specimens:   Specimen (24h ago, onward)       Start     Ordered    01/29/25 0826  Specimen to Pathology  RELEASE UPON ORDERING        References:    Click here for ordering Quick Tip   Question:  Release to patient  Answer:  Immediate    01/29/25 0826                            Condition: Good    Disposition: PACU - hemodynamically stable.    Fallon Elmore MD  [facility] General Surgery

## 2025-01-29 NOTE — PLAN OF CARE
Problem: Adult Inpatient Plan of Care  Goal: Plan of Care Review  Outcome: Progressing  Flowsheets (Taken 1/28/2025 1929)  Plan of Care Reviewed With: patient  Goal: Patient-Specific Goal (Individualized)  Outcome: Progressing  Goal: Absence of Hospital-Acquired Illness or Injury  Outcome: Progressing  Intervention: Identify and Manage Fall Risk  Flowsheets (Taken 1/28/2025 1929)  Safety Promotion/Fall Prevention:   assistive device/personal item within reach   side rails raised x 2   Fall Risk signage in place   Fall Risk reviewed with patient/family   nonskid shoes/socks when out of bed   instructed to call staff for mobility   lighting adjusted   medications reviewed  Intervention: Prevent Skin Injury  Flowsheets (Taken 1/28/2025 1929)  Body Position: position changed independently  Device Skin Pressure Protection: absorbent pad utilized/changed  Intervention: Prevent and Manage VTE (Venous Thromboembolism) Risk  Flowsheets (Taken 1/28/2025 1929)  VTE Prevention/Management:   dorsiflexion/plantar flexion performed   fluids promoted   ambulation promoted  Intervention: Prevent Infection  Flowsheets (Taken 1/28/2025 1929)  Infection Prevention:   rest/sleep promoted   single patient room provided   hand hygiene promoted  Goal: Optimal Comfort and Wellbeing  Outcome: Progressing  Intervention: Monitor Pain and Promote Comfort  Flowsheets (Taken 1/28/2025 1929)  Pain Management Interventions:   position adjusted   pillow support provided   care clustered  Intervention: Provide Person-Centered Care  Flowsheets (Taken 1/28/2025 1929)  Trust Relationship/Rapport:   care explained   choices provided   emotional support provided   empathic listening provided   questions answered   questions encouraged   thoughts/feelings acknowledged     Problem: Pneumonia  Goal: Fluid Balance  Outcome: Progressing  Goal: Resolution of Infection Signs and Symptoms  Outcome: Progressing  Intervention: Prevent Infection  Progression  Flowsheets (Taken 1/28/2025 1929)  Infection Management: aseptic technique maintained  Goal: Effective Oxygenation and Ventilation  Outcome: Progressing  Intervention: Promote Airway Secretion Clearance  Flowsheets (Taken 1/28/2025 1929)  Breathing Techniques/Airway Clearance: deep/controlled cough encouraged  Intervention: Optimize Oxygenation and Ventilation  Flowsheets (Taken 1/28/2025 1929)  Head of Bed (HOB) Positioning: HOB at 20 degrees     Problem: Fall Injury Risk  Goal: Absence of Fall and Fall-Related Injury  Outcome: Progressing  Intervention: Identify and Manage Contributors  Flowsheets (Taken 1/28/2025 1929)  Self-Care Promotion: independence encouraged  Medication Review/Management: medications reviewed  Intervention: Promote Injury-Free Environment  Flowsheets (Taken 1/28/2025 1929)  Safety Promotion/Fall Prevention:   assistive device/personal item within reach   side rails raised x 2   Fall Risk signage in place   Fall Risk reviewed with patient/family   nonskid shoes/socks when out of bed   instructed to call staff for mobility   lighting adjusted   medications reviewed

## 2025-01-29 NOTE — ANESTHESIA PROCEDURE NOTES
Intubation    Date/Time: 1/29/2025 7:25 AM    Performed by: Boston Alberto CRNA  Authorized by: Bhupinder Blue MD    Intubation:     Induction:  Intravenous    Intubated:  Postinduction    Mask Ventilation:  Easy mask    Attempts:  1    Attempted By:  CRNA    Method of Intubation:  Direct    Blade:  Maria Del Rosario 3    Laryngeal View Grade: Grade I - full view of cords      Difficult Airway Encountered?: No      Complications:  None    Airway Device:  Oral endotracheal tube    Airway Device Size:  7.5    Style/Cuff Inflation:  Cuffed    Tube secured:  23    Secured at:  The lips    Placement Verified By:  Capnometry    Complicating Factors:  None    Findings Post-Intubation:  BS equal bilateral

## 2025-01-29 NOTE — ANESTHESIA PREPROCEDURE EVALUATION
"                                                                                                             01/29/2025  Oracio Gold is a 49 y.o., male.Patient reports that about a week ago he had an episode of RLQ abdominal pain for which a CT scan was obtained, it showed no pathology other than known gallstones. RUQ U/S obtained today shows hepatic steatosis and multiple gallstones without thickened gallbladder or pericholecystic fluid. He denies fever, chills, diarrhea or any other associated symptoms.       Diagnosis:   Symptomatic cholelithiasis [K80.20]   Pre-op diagnosis: Symptomatic cholelithiasis [K80.20]   Location: [facility] OR  / [facility] OR     The pt. comes to [facility] for the noted procedure under  [facility].  Case: [id number] Date/Time: 01/29/25 0720   Procedure:   CHOLECYSTECTOMY, LAPAROSCOPIC (Abdomen)   Anesthesia type: General     PMHx:  Problem List  Current as of 01/29/25 0454  Angioedema Community acquired pneumonia of right middle lobe of lung   History of DVT (deep vein thrombosis) History of pulmonary embolism         PSHx:  None      Vital signs:  Pre Vitals  Current as of 01/29/25 0454  BP: 136/91 Pulse: 56   Resp: 18 SpO2: 94   Temp: 36.4 °C (97.5 °F)   Height: 5' 9" (1.753 m) (01/28/25) Weight: 99.4 kg (219 lb 1.6 oz) (01/28/25)   BMI: 32.36 IBW: 70.7 kg (155 lb 15.1 oz)   Last edited 01/29/25 0422 by NICHOLAS      Lab Data:      Echo: EF:55-70%        EKG:              Pre-op Assessment    I have reviewed the Patient Summary Reports.     I have reviewed the Nursing Notes. I have reviewed the NPO Status.   I have reviewed the Medications.     Review of Systems  Anesthesia Hx:  No problems with previous Anesthesia                Social:  Smoker, Social Alcohol Use ETOH: Beer  Tobacco: 1pk /day      Hematology/Oncology:  Hematology Normal   Oncology Normal                                   EENT/Dental:  EENT/Dental Normal           Cardiovascular:  Cardiovascular Normal Exercise tolerance: good                "      Functional Capacity good / => 4 METS                         Pulmonary:  Pneumonia                  Pulmonary Infection:  Pneumonia.     Renal/:  Renal/ Normal                 Hepatic/GI:  Hepatic/GI Normal                    Musculoskeletal:  Musculoskeletal Normal                Neurological:  Neurology Normal                                      Endocrine:  Endocrine Normal            Dermatological:  Skin Normal    Psych:  Psychiatric Normal                    Physical Exam  General: Alert, Oriented, Well nourished and Cooperative    Airway:  Mallampati: II   Mouth Opening: Normal  TM Distance: Normal  Tongue: Normal  Neck ROM: Normal ROM    Dental:  Intact    Chest/Lungs:  Clear to auscultation, Normal Respiratory Rate    Heart:  Rate: Normal  Rhythm: Regular Rhythm        Anesthesia Plan  Type of Anesthesia, risks & benefits discussed:    Anesthesia Type: Gen ETT  Intra-op Monitoring Plan: Standard ASA Monitors  Post Op Pain Control Plan: IV/PO Opioids PRN  Induction:  IV and Inhalation  Airway Plan: Direct  Informed Consent: Informed consent signed with the Patient and all parties understand the risks and agree with anesthesia plan.  All questions answered. Patient consented to blood products? Yes  ASA Score: 3  Day of Surgery Review of History & Physical: H&P Update referred to the surgeon/provider.    Ready For Surgery From Anesthesia Perspective.     .

## 2025-01-30 ENCOUNTER — PATIENT OUTREACH (OUTPATIENT)
Dept: ADMINISTRATIVE | Facility: CLINIC | Age: 50
End: 2025-01-30
Payer: COMMERCIAL

## 2025-01-30 DIAGNOSIS — K80.20 SYMPTOMATIC CHOLELITHIASIS: ICD-10-CM

## 2025-01-30 RX ORDER — OXYCODONE HYDROCHLORIDE 5 MG/1
5 TABLET ORAL EVERY 8 HOURS PRN
Qty: 7 TABLET | Refills: 0 | Status: SHIPPED | OUTPATIENT
Start: 2025-01-30

## 2025-01-30 NOTE — ANESTHESIA POSTPROCEDURE EVALUATION
Anesthesia Post Evaluation    Patient: Oracio Gold    Procedure(s) Performed: Procedure(s) (LRB):  CHOLECYSTECTOMY, LAPAROSCOPIC (N/A)    Final Anesthesia Type: general      Patient location during evaluation: PACU  Patient participation: Yes- Able to Participate  Level of consciousness: awake and alert and oriented  Post-procedure vital signs: reviewed and stable  Pain management: adequate  Airway patency: patent  MANUEL mitigation strategies: Verification of full reversal of neuromuscular block  PONV status at discharge: No PONV  Anesthetic complications: no      Cardiovascular status: blood pressure returned to baseline and stable  Respiratory status: spontaneous ventilation and unassisted  Hydration status: euvolemic  Follow-up not needed.  Comments: [facility]              Vitals Value Taken Time   /74 01/29/25 1548   Temp 37.4 °C (99.4 °F) 01/29/25 1548   Pulse 63 01/29/25 1548   Resp 20 01/29/25 1548   SpO2 91 % 01/29/25 1548         Event Time   Out of Recovery 01/29/2025 09:42:00         Pain/Dari Score: Pain Rating Prior to Med Admin: 8 (1/29/2025 12:05 PM)  Pain Rating Post Med Admin: 2 (1/29/2025  1:05 PM)  Dari Score: 9 (1/29/2025  9:30 AM)

## 2025-01-30 NOTE — DISCHARGE SUMMARY
[facility] - General Surgery  Discharge Summary      Patient Name: Oracio Gold  MRN: [id number]  Admission Date: 1/28/2025  Hospital Length of Stay: 0 days  Discharge Date and Time:  01/29/2025 6:16 PM  Attending Physician: Ankit Mir MD   Discharging Provider: Fallon Elmore MD  Primary Care Provider: Jessica Loyd NP        Procedure(s) (LRB):  CHOLECYSTECTOMY, LAPAROSCOPIC (N/A)      Indwelling Lines/Drains at time of discharge:   Lines/Drains/Airways       None                 Hospital Course:   48 y/o M with hx of DVT and PE that presents with symptomatic cholelithiasis. Patient underwent lap cholecystectomy and tolerated diet, ambulated, and pain was controlled. Appropriate for discharge and can restart eliquis 1/30.      Consults:   Consults (From admission, onward)          Status Ordering Provider     Inpatient consult to General surgery  Once        Provider:  Ankit Mir MD    Completed KRYSTIAN GOLD            Significant Diagnostic Studies: N/A    Pending Diagnostic Studies:       Procedure Component Value Units Date/Time    Specimen to Pathology [[id number]] Collected: 01/29/25 0806    Order Status: Sent Lab Status: In process Updated: 01/29/25 1054    Specimen: Tissue from Gallbladder           Final Active Diagnoses:    Diagnosis Date Noted POA    PRINCIPAL PROBLEM:  Symptomatic cholelithiasis [K80.20] 01/29/2025 Yes      Problems Resolved During this Admission:      Discharged Condition: good    Disposition: Home or Self Care    Follow Up:   Follow-up Information       Clinic, Acute Care Surgery Follow up.    Why: As needed  Contact information:  [address]  [address]  [facility] [address]  [phone]                           Patient Instructions:   No discharge procedures on file.  Medications:  Reconciled Home Medications:      Medication List        START taking these medications      oxyCODONE 5 MG immediate release tablet  Commonly known as: ROXICODONE  Take 1  tablet (5 mg total) by mouth every 8 (eight) hours as needed for Pain.            CONTINUE taking these medications      albuterol 90 mcg/actuation inhaler  Commonly known as: PROVENTIL/VENTOLIN HFA  Inhale 1-2 puffs into the lungs every 6 (six) hours as needed for Wheezing. Rescue     apixaban 5 mg Tab  Commonly known as: ELIQUIS  Take 1 tablet (5 mg total) by mouth 2 (two) times daily.     cholestyramine (with sugar) 4 gram Powd  Take by mouth.     citalopram 10 MG tablet  Commonly known as: CeleXA  Take 10 mg by mouth once daily.     EPINEPHrine 0.3 mg/0.3 mL Atin  Commonly known as: EPIPEN  Inject 0.3 mLs (0.3 mg total) into the muscle as needed (anaphylaxis).     nebivoloL 10 MG Tab  Commonly known as: BYSTOLIC  Take 10 mg by mouth once daily.     pantoprazole 40 MG tablet  Commonly known as: PROTONIX  Take 40 mg by mouth once daily.     valsartan-hydrochlorothiazide 320-12.5 mg per tablet  Commonly known as: DIOVAN-HCT  Take 1 tablet by mouth once daily.            Time spent on the discharge of patient: 20 minutes         Fallon Elmore MD  [facility] - General Surgery

## 2025-01-30 NOTE — PROGRESS NOTES
2nd attempt-C3 nurse attempted to [place] for a [facility] post hospital discharge follow up call after the patient called and left a voicemail on the TCM message system. No answer. Left voicemail with callback information. The patient does not have a scheduled HOSFU appointment with Jessica Loyd NP.  The patient does have a post op appt with [facility]; 2/4/25 @ 12:30 telehealth.

## 2025-01-30 NOTE — PROGRESS NOTES
C3 nurse attempted to contact Oracio Gold for a TCC post hospital discharge follow up call. No answer. Left voicemail with callback information. The patient does not have a scheduled HOSFU appointment with Jessica Loyd NP.  The patient does have a post op appt with [facility]; 2/4/25 @ 12:30 telehealth.

## 2025-01-31 LAB — PSYCHE PATHOLOGY RESULT: NORMAL

## 2025-01-31 NOTE — PROGRESS NOTES
3rd attempt-C3 nurse attempted to contact [place] [facility] for a TCC post hospital discharge follow up call. No answer. Left voicemail with callback information. The patient does not have a scheduled HOSFU appointment with Jessica Loyd NP.  The patient does have a post op appt with [facility]; 2/4/25 @ 12:30 telehealth.

## 2025-02-03 ENCOUNTER — TELEPHONE (OUTPATIENT)
Facility: CLINIC | Age: 50
End: 2025-02-03
Payer: COMMERCIAL

## 2025-02-04 ENCOUNTER — TELEPHONE (OUTPATIENT)
Dept: SURGERY | Facility: CLINIC | Age: 50
End: 2025-02-04

## 2025-02-04 NOTE — TELEPHONE ENCOUNTER
Patient has not completed the requirements for telemed visit and would like to reschedule appointment. Clinic staff notified to reschedule.

## 2025-02-11 ENCOUNTER — TELEPHONE (OUTPATIENT)
Facility: CLINIC | Age: 50
End: 2025-02-11
Payer: COMMERCIAL

## 2025-02-11 NOTE — TELEPHONE ENCOUNTER
Attempted to contact Mr. Narayanan to confirm his 2/11 virtual with gen gerry ELIZABETH, Katia QUINTERO and reminded him to do his precheck in. No answer LVM

## 2025-06-10 ENCOUNTER — TELEPHONE (OUTPATIENT)
Dept: HEMATOLOGY/ONCOLOGY | Facility: CLINIC | Age: 50
End: 2025-06-10
Payer: COMMERCIAL

## 2025-06-10 NOTE — TELEPHONE ENCOUNTER
Received call from [facility] with Dr. Shellie Collier's office. They are filling out clearance for patient to have colonoscopy. Patient is on eliquis. Dr. Collier would like to know what our recommendations are for holding. Please advise.

## 2025-06-10 NOTE — TELEPHONE ENCOUNTER
We normally recommend holding Eliquis for 48 hours prior to procedure and resume per GI recommendations which is typically later on that day or the next morning.

## (undated) DEVICE — APPLIER CLIP ENDO LIGAMAX 5MM

## (undated) DEVICE — CHLORAPREP W TINT 26ML APPL

## (undated) DEVICE — SYR 10CC LUER LOCK

## (undated) DEVICE — SUT MCRYL PLUS 4-0 PS2 27IN

## (undated) DEVICE — KIT GEN LAPAROSCOPY LAFAYETTE

## (undated) DEVICE — TROCAR ENDOPATH XCEL 11MM 10CM

## (undated) DEVICE — IRRIGATOR HYDRO-SURG PLUS 5MM

## (undated) DEVICE — CORD LAP 10 DISP

## (undated) DEVICE — WARMER DRAPE STERILE LF

## (undated) DEVICE — SUT VICRYL PLUS 4-0 PS2 27

## (undated) DEVICE — SCISSOR CURVED ENDOPATH 5MM

## (undated) DEVICE — TROCAR ENDOPATH XCEL 5X100MM

## (undated) DEVICE — NDL HYPO 22GX1 1/2 SYR 10ML LL

## (undated) DEVICE — ELECTRODE PATIENT RETURN DISP

## (undated) DEVICE — DRESSING TELFA N ADH 3X8

## (undated) DEVICE — NDL HYPO REG 25G X 1 1/2

## (undated) DEVICE — ADHESIVE DERMABOND ADVANCED

## (undated) DEVICE — STRIP MEDI WND CLSR 1/2X4IN

## (undated) DEVICE — SUT VICRYL+ 27 UR-6 VIOL

## (undated) DEVICE — SUPPORT ULNA NERVE PROTECTOR

## (undated) DEVICE — CANNULA ENDOPATH XCEL 5X100MM

## (undated) DEVICE — GLOVE PROTEXIS HYDROGEL SZ7.5

## (undated) DEVICE — KIT SURGICAL TURNOVER

## (undated) DEVICE — SOL IRRI STRL WATER 1000ML